# Patient Record
Sex: MALE | Race: ASIAN | NOT HISPANIC OR LATINO | URBAN - METROPOLITAN AREA
[De-identification: names, ages, dates, MRNs, and addresses within clinical notes are randomized per-mention and may not be internally consistent; named-entity substitution may affect disease eponyms.]

---

## 2021-12-08 ENCOUNTER — EMERGENCY (EMERGENCY)
Facility: HOSPITAL | Age: 26
LOS: 1 days | Discharge: HOME | End: 2021-12-08
Attending: STUDENT IN AN ORGANIZED HEALTH CARE EDUCATION/TRAINING PROGRAM
Payer: MEDICAID

## 2021-12-08 VITALS
HEIGHT: 63 IN | RESPIRATION RATE: 16 BRPM | SYSTOLIC BLOOD PRESSURE: 104 MMHG | TEMPERATURE: 99 F | HEART RATE: 90 BPM | WEIGHT: 130.07 LBS | DIASTOLIC BLOOD PRESSURE: 68 MMHG

## 2021-12-08 VITALS
SYSTOLIC BLOOD PRESSURE: 97 MMHG | RESPIRATION RATE: 17 BRPM | TEMPERATURE: 98 F | DIASTOLIC BLOOD PRESSURE: 62 MMHG | OXYGEN SATURATION: 98 % | HEART RATE: 88 BPM

## 2021-12-08 DIAGNOSIS — F29 UNSPECIFIED PSYCHOSIS NOT DUE TO A SUBSTANCE OR KNOWN PHYSIOLOGICAL CONDITION: ICD-10-CM

## 2021-12-08 LAB
ALBUMIN SERPL ELPH-MCNC: 4 G/DL — SIGNIFICANT CHANGE UP (ref 3.5–5)
ALP SERPL-CCNC: 53 U/L — SIGNIFICANT CHANGE UP (ref 40–120)
ALT FLD-CCNC: 33 U/L DA — SIGNIFICANT CHANGE UP (ref 10–60)
ANION GAP SERPL CALC-SCNC: 5 MMOL/L — SIGNIFICANT CHANGE UP (ref 5–17)
APAP SERPL-MCNC: <2 UG/ML — LOW (ref 10–30)
AST SERPL-CCNC: 25 U/L — SIGNIFICANT CHANGE UP (ref 10–40)
BASOPHILS # BLD AUTO: 0.02 K/UL — SIGNIFICANT CHANGE UP (ref 0–0.2)
BASOPHILS NFR BLD AUTO: 0.3 % — SIGNIFICANT CHANGE UP (ref 0–2)
BILIRUB SERPL-MCNC: 0.6 MG/DL — SIGNIFICANT CHANGE UP (ref 0.2–1.2)
BUN SERPL-MCNC: 15 MG/DL — SIGNIFICANT CHANGE UP (ref 7–18)
CALCIUM SERPL-MCNC: 9.3 MG/DL — SIGNIFICANT CHANGE UP (ref 8.4–10.5)
CHLORIDE SERPL-SCNC: 109 MMOL/L — HIGH (ref 96–108)
CO2 SERPL-SCNC: 26 MMOL/L — SIGNIFICANT CHANGE UP (ref 22–31)
CREAT SERPL-MCNC: 0.87 MG/DL — SIGNIFICANT CHANGE UP (ref 0.5–1.3)
EOSINOPHIL # BLD AUTO: 0.1 K/UL — SIGNIFICANT CHANGE UP (ref 0–0.5)
EOSINOPHIL NFR BLD AUTO: 1.3 % — SIGNIFICANT CHANGE UP (ref 0–6)
ETHANOL SERPL-MCNC: <3 MG/DL — SIGNIFICANT CHANGE UP (ref 0–10)
GLUCOSE SERPL-MCNC: 95 MG/DL — SIGNIFICANT CHANGE UP (ref 70–99)
HCT VFR BLD CALC: 47 % — SIGNIFICANT CHANGE UP (ref 39–50)
HGB BLD-MCNC: 14.9 G/DL — SIGNIFICANT CHANGE UP (ref 13–17)
IMM GRANULOCYTES NFR BLD AUTO: 0.5 % — SIGNIFICANT CHANGE UP (ref 0–1.5)
LYMPHOCYTES # BLD AUTO: 1.97 K/UL — SIGNIFICANT CHANGE UP (ref 1–3.3)
LYMPHOCYTES # BLD AUTO: 25.7 % — SIGNIFICANT CHANGE UP (ref 13–44)
MCHC RBC-ENTMCNC: 30 PG — SIGNIFICANT CHANGE UP (ref 27–34)
MCHC RBC-ENTMCNC: 31.7 GM/DL — LOW (ref 32–36)
MCV RBC AUTO: 94.8 FL — SIGNIFICANT CHANGE UP (ref 80–100)
MONOCYTES # BLD AUTO: 0.65 K/UL — SIGNIFICANT CHANGE UP (ref 0–0.9)
MONOCYTES NFR BLD AUTO: 8.5 % — SIGNIFICANT CHANGE UP (ref 2–14)
NEUTROPHILS # BLD AUTO: 4.88 K/UL — SIGNIFICANT CHANGE UP (ref 1.8–7.4)
NEUTROPHILS NFR BLD AUTO: 63.7 % — SIGNIFICANT CHANGE UP (ref 43–77)
NRBC # BLD: 0 /100 WBCS — SIGNIFICANT CHANGE UP (ref 0–0)
PLATELET # BLD AUTO: 366 K/UL — SIGNIFICANT CHANGE UP (ref 150–400)
POTASSIUM SERPL-MCNC: 4.1 MMOL/L — SIGNIFICANT CHANGE UP (ref 3.5–5.3)
POTASSIUM SERPL-SCNC: 4.1 MMOL/L — SIGNIFICANT CHANGE UP (ref 3.5–5.3)
PROT SERPL-MCNC: 7.7 G/DL — SIGNIFICANT CHANGE UP (ref 6–8.3)
RBC # BLD: 4.96 M/UL — SIGNIFICANT CHANGE UP (ref 4.2–5.8)
RBC # FLD: 13.5 % — SIGNIFICANT CHANGE UP (ref 10.3–14.5)
SALICYLATES SERPL-MCNC: <1.7 MG/DL — LOW (ref 2.8–20)
SARS-COV-2 RNA SPEC QL NAA+PROBE: SIGNIFICANT CHANGE UP
SODIUM SERPL-SCNC: 140 MMOL/L — SIGNIFICANT CHANGE UP (ref 135–145)
WBC # BLD: 7.66 K/UL — SIGNIFICANT CHANGE UP (ref 3.8–10.5)
WBC # FLD AUTO: 7.66 K/UL — SIGNIFICANT CHANGE UP (ref 3.8–10.5)

## 2021-12-08 PROCEDURE — 93005 ELECTROCARDIOGRAM TRACING: CPT

## 2021-12-08 PROCEDURE — 93010 ELECTROCARDIOGRAM REPORT: CPT

## 2021-12-08 PROCEDURE — 85025 COMPLETE CBC W/AUTO DIFF WBC: CPT

## 2021-12-08 PROCEDURE — 99285 EMERGENCY DEPT VISIT HI MDM: CPT

## 2021-12-08 PROCEDURE — 36415 COLL VENOUS BLD VENIPUNCTURE: CPT

## 2021-12-08 PROCEDURE — 87635 SARS-COV-2 COVID-19 AMP PRB: CPT

## 2021-12-08 PROCEDURE — 80307 DRUG TEST PRSMV CHEM ANLYZR: CPT

## 2021-12-08 PROCEDURE — 90792 PSYCH DIAG EVAL W/MED SRVCS: CPT | Mod: 95

## 2021-12-08 PROCEDURE — 80053 COMPREHEN METABOLIC PANEL: CPT

## 2021-12-08 RX ORDER — MIDAZOLAM HYDROCHLORIDE 1 MG/ML
2 INJECTION, SOLUTION INTRAMUSCULAR; INTRAVENOUS ONCE
Refills: 0 | Status: COMPLETED | OUTPATIENT
Start: 2021-12-08 | End: 2021-12-08

## 2021-12-08 NOTE — ED ADULT NURSE NOTE - SUICIDE SCREENING QUESTION 1
Acute osteomyelitis of the R first distal phalanx  R hallux wound since 10/2020 with infection, s/p augmentin, had worsening of infection with gangrene, purulence and malodor. Had been on doxycycline at home. Seen in wound care clinic and recommended for amputation.   MRI confirmed OM of the R first distal phalanx.  S/p pip-tazo once in the ER, held antibiotic for now   Afebrile, nontoxic, no leukocytosis    Follow R hallux wound culture  Podiatry following, planning for OR for amputation  Vascular surgery and wound care consulted   Hold off on further antibiotic dosing at this time, patient stable, try to increase OR culture yield.  Discussed plan as above with patient at length, all questions and concerns addressed, patient waiting for OR for amputation No

## 2021-12-08 NOTE — ED ADULT NURSE NOTE - OBJECTIVE STATEMENT
"My  wants me to see a psychiatrist as I am hearing voices, I feel people are talking about me". Roland suicidal ideation ,

## 2021-12-08 NOTE — ED BEHAVIORAL HEALTH ASSESSMENT NOTE - DESCRIPTION
as per HPI Per RN, pt was initially calm but after ~6 hours waiting in the ED, he became verbally aggressive, stating he wanted to leave rather than wait for psychiatric evaluation.  Pt declined medication and was able to calm down without need for PRN medication.  Pt denied SI but reports he wants to punch things when he gets angry.  Pt was BIBS and has not had any visitors. HIV+

## 2021-12-08 NOTE — ED PROVIDER NOTE - OBJECTIVE STATEMENT
25-year-old male hx of HIV reportedly well controlled, unclear psych hx presenting with hearing voices. Denies SI, HI, visual hallucinations. Had this problem in the past 5-6 years ago, does not know of any psych diagnoses and does not take any Psych meds. No other symptoms.

## 2021-12-08 NOTE — ED BEHAVIORAL HEALTH ASSESSMENT NOTE - DIFFERENTIAL
substance-induced mood/psychotic disorder, depression, schizophrenia, anxiety, insomnia, PTSD, adjustment disorder

## 2021-12-08 NOTE — ED PROVIDER NOTE - CLINICAL SUMMARY MEDICAL DECISION MAKING FREE TEXT BOX
25-year-old male hx of HIV reportedly well controlled, unclear psych hx presenting with hearing voices. Medically cleared. Pending Psych eval.

## 2021-12-08 NOTE — ED PROVIDER NOTE - PATIENT PORTAL LINK FT
You can access the FollowMyHealth Patient Portal offered by [facility] by registering at the following website: [email or web address]. By joining [employer] [facility]’s FollowMyHealth portal, you will also be able to view your health information using other applications (apps) compatible with our system.

## 2021-12-08 NOTE — ED BEHAVIORAL HEALTH ASSESSMENT NOTE - LEGAL HISTORY
on probation for drug-related charges, states he has to do community service and has not actually been convicted yet, denies hx of violent offenses

## 2021-12-08 NOTE — ED BEHAVIORAL HEALTH ASSESSMENT NOTE - RISK ASSESSMENT
Low Acute Suicide Risk risk factors: hx substance use, male, single, not engaged in outpatient tx, hx SA, hx trauma    protective factors: denies SI/HI, denies hx of violence, denies access to guns/weapons, future oriented, identifies reasons to live, able to participate in safety planning, motivated for outpatient tx, engaged in work
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
## 2021-12-08 NOTE — ED ADULT NURSE NOTE - NSIMPLEMENTINTERV_GEN_ALL_ED
Implemented All Universal Safety Interventions:  [place] to call system. Call bell, personal items and telephone within reach. Instruct patient to call for assistance. Room bathroom lighting operational. Non-slip footwear when patient is off stretcher. Physically safe environment: no spills, clutter or unnecessary equipment. Stretcher in lowest position, wheels locked, appropriate side rails in place.
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
## 2021-12-08 NOTE — ED ADULT TRIAGE NOTE - CHIEF COMPLAINT QUOTE
"My  wants me to see a psychiatrist as I am hearing voices, I feel people are talking about me". Roland suicidal ideation

## 2021-12-08 NOTE — ED BEHAVIORAL HEALTH ASSESSMENT NOTE - SAFETY PLAN ADDT'L DETAILS
Safety plan discussed with.../Education provided regarding environmental safety / lethal means restriction/Provision of National Suicide Prevention Lifeline 9-228-011-MBBY (7444)

## 2021-12-08 NOTE — ED PROVIDER NOTE - PROGRESS NOTE DETAILS
jeancarlos:  s/o received from dr. harris  pt w unk psych history p/w auditory hallucinations ongoing, no si/hi  endorsed pending psych eval  pt seen by telepsych and I spoke with attending who states that pt is cleared, mentating clearly, can f/up with outpt facility, resources faxed. pt w no si/hi, aaox3, is calm, did not receive the benzos, so I held it given that pt is doing well currently. pt wants to leave. stable for dc w psych f/up.
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
## 2023-01-03 NOTE — ED ADULT NURSE NOTE - GROOMING
**[employer] accepts [employer]. [facility]   [facility] [address] Renetta Robin, [address] [facility]  Phone: [phone] [facility] [address] [facility]  [address]. [place], [address]  Phone: [phone]       Please be informed that if you contact our office outside of normal business hours the physician on call cannot help with any scheduling or rescheduling issues, procedure instruction questions or any type of medication issue. We advise you for any urgent/emergency that you go to the nearest emergency room! PLEASE CALL OUR OFFICE DURING NORMAL BUSINESS HOURS    Monday - Friday   8 am to 5 pm    [place] [address] [address]: [phone]    [place]:  [phone]    Thank you for allowing us to care for you today! We want to ensure we can follow your treatment plan and we strive to give you the best outcomes and experience possible. If you ever have a life threatening emergency and call 911 - for an ambulance (EMS)   Our providers can only care for you at:   [facility] or [facility]. Even if you have someone take you or you drive yourself we can only care for you in a [facility] facility. Our providers are not setup at the other healthcare locations!
[facility]

## 2023-02-11 ENCOUNTER — EMERGENCY (EMERGENCY)
Facility: HOSPITAL | Age: 28
LOS: 1 days | Discharge: HOME | End: 2023-02-11
Attending: EMERGENCY MEDICINE
Payer: MEDICAID

## 2023-02-11 VITALS
RESPIRATION RATE: 17 BRPM | TEMPERATURE: 98 F | SYSTOLIC BLOOD PRESSURE: 114 MMHG | DIASTOLIC BLOOD PRESSURE: 82 MMHG | HEART RATE: 95 BPM | OXYGEN SATURATION: 98 %

## 2023-02-11 VITALS
SYSTOLIC BLOOD PRESSURE: 126 MMHG | OXYGEN SATURATION: 99 % | HEART RATE: 95 BPM | DIASTOLIC BLOOD PRESSURE: 74 MMHG | RESPIRATION RATE: 16 BRPM | WEIGHT: 128.09 LBS | HEIGHT: 63 IN | TEMPERATURE: 98 F

## 2023-02-11 LAB
ALBUMIN SERPL ELPH-MCNC: 4.4 G/DL — SIGNIFICANT CHANGE UP (ref 3.5–5)
ALP SERPL-CCNC: 62 U/L — SIGNIFICANT CHANGE UP (ref 40–120)
ALT FLD-CCNC: 41 U/L DA — SIGNIFICANT CHANGE UP (ref 10–60)
ANION GAP SERPL CALC-SCNC: 8 MMOL/L — SIGNIFICANT CHANGE UP (ref 5–17)
AST SERPL-CCNC: 44 U/L — HIGH (ref 10–40)
BASOPHILS # BLD AUTO: 0.02 K/UL — SIGNIFICANT CHANGE UP (ref 0–0.2)
BASOPHILS NFR BLD AUTO: 0.2 % — SIGNIFICANT CHANGE UP (ref 0–2)
BILIRUB SERPL-MCNC: 0.9 MG/DL — SIGNIFICANT CHANGE UP (ref 0.2–1.2)
BUN SERPL-MCNC: 18 MG/DL — SIGNIFICANT CHANGE UP (ref 7–18)
CALCIUM SERPL-MCNC: 9.6 MG/DL — SIGNIFICANT CHANGE UP (ref 8.4–10.5)
CHLORIDE SERPL-SCNC: 104 MMOL/L — SIGNIFICANT CHANGE UP (ref 96–108)
CO2 SERPL-SCNC: 28 MMOL/L — SIGNIFICANT CHANGE UP (ref 22–31)
CREAT SERPL-MCNC: 0.96 MG/DL — SIGNIFICANT CHANGE UP (ref 0.5–1.3)
EGFR: 110 ML/MIN/1.73M2 — SIGNIFICANT CHANGE UP
EOSINOPHIL # BLD AUTO: 0 K/UL — SIGNIFICANT CHANGE UP (ref 0–0.5)
EOSINOPHIL NFR BLD AUTO: 0 % — SIGNIFICANT CHANGE UP (ref 0–6)
ETHANOL SERPL-MCNC: <3 MG/DL — SIGNIFICANT CHANGE UP (ref 0–10)
GLUCOSE SERPL-MCNC: 112 MG/DL — HIGH (ref 70–99)
HCT VFR BLD CALC: 47.7 % — SIGNIFICANT CHANGE UP (ref 39–50)
HGB BLD-MCNC: 15.9 G/DL — SIGNIFICANT CHANGE UP (ref 13–17)
IMM GRANULOCYTES NFR BLD AUTO: 0.3 % — SIGNIFICANT CHANGE UP (ref 0–0.9)
LYMPHOCYTES # BLD AUTO: 1.05 K/UL — SIGNIFICANT CHANGE UP (ref 1–3.3)
LYMPHOCYTES # BLD AUTO: 12.1 % — LOW (ref 13–44)
MAGNESIUM SERPL-MCNC: 2.4 MG/DL — SIGNIFICANT CHANGE UP (ref 1.6–2.6)
MCHC RBC-ENTMCNC: 30.8 PG — SIGNIFICANT CHANGE UP (ref 27–34)
MCHC RBC-ENTMCNC: 33.3 GM/DL — SIGNIFICANT CHANGE UP (ref 32–36)
MCV RBC AUTO: 92.4 FL — SIGNIFICANT CHANGE UP (ref 80–100)
MONOCYTES # BLD AUTO: 0.41 K/UL — SIGNIFICANT CHANGE UP (ref 0–0.9)
MONOCYTES NFR BLD AUTO: 4.7 % — SIGNIFICANT CHANGE UP (ref 2–14)
NEUTROPHILS # BLD AUTO: 7.15 K/UL — SIGNIFICANT CHANGE UP (ref 1.8–7.4)
NEUTROPHILS NFR BLD AUTO: 82.7 % — HIGH (ref 43–77)
NRBC # BLD: 0 /100 WBCS — SIGNIFICANT CHANGE UP (ref 0–0)
PCP SPEC-MCNC: SIGNIFICANT CHANGE UP
PLATELET # BLD AUTO: 416 K/UL — HIGH (ref 150–400)
POTASSIUM SERPL-MCNC: 4.1 MMOL/L — SIGNIFICANT CHANGE UP (ref 3.5–5.3)
POTASSIUM SERPL-SCNC: 4.1 MMOL/L — SIGNIFICANT CHANGE UP (ref 3.5–5.3)
PROT SERPL-MCNC: 8.6 G/DL — HIGH (ref 6–8.3)
RBC # BLD: 5.16 M/UL — SIGNIFICANT CHANGE UP (ref 4.2–5.8)
RBC # FLD: 12.9 % — SIGNIFICANT CHANGE UP (ref 10.3–14.5)
SODIUM SERPL-SCNC: 140 MMOL/L — SIGNIFICANT CHANGE UP (ref 135–145)
WBC # BLD: 8.66 K/UL — SIGNIFICANT CHANGE UP (ref 3.8–10.5)
WBC # FLD AUTO: 8.66 K/UL — SIGNIFICANT CHANGE UP (ref 3.8–10.5)

## 2023-02-11 PROCEDURE — 83735 ASSAY OF MAGNESIUM: CPT

## 2023-02-11 PROCEDURE — 85025 COMPLETE CBC W/AUTO DIFF WBC: CPT

## 2023-02-11 PROCEDURE — 80053 COMPREHEN METABOLIC PANEL: CPT

## 2023-02-11 PROCEDURE — 99283 EMERGENCY DEPT VISIT LOW MDM: CPT

## 2023-02-11 PROCEDURE — 36415 COLL VENOUS BLD VENIPUNCTURE: CPT

## 2023-02-11 PROCEDURE — 80307 DRUG TEST PRSMV CHEM ANLYZR: CPT

## 2023-02-11 PROCEDURE — 99284 EMERGENCY DEPT VISIT MOD MDM: CPT

## 2023-02-11 PROCEDURE — 99053 MED SERV 10PM-8AM 24 HR FAC: CPT

## 2023-02-11 RX ORDER — SODIUM CHLORIDE 9 MG/ML
1000 INJECTION INTRAMUSCULAR; INTRAVENOUS; SUBCUTANEOUS ONCE
Refills: 0 | Status: DISCONTINUED | OUTPATIENT
Start: 2023-02-11 | End: 2023-02-14

## 2023-02-11 NOTE — ED PROVIDER NOTE - PATIENT PORTAL LINK FT
You can access the FollowMyHealth Patient Portal offered by [facility] by registering at the following website: [email or web address]. By joining [employer]’s FollowMyHealth portal, you will also be able to view your health information using other applications (apps) compatible with our system.

## 2023-02-11 NOTE — ED ADULT NURSE NOTE - OBJECTIVE STATEMENT
pt stated they gave me a drink c/o feeling dizzy and hard to breathe. pt talking in complete sentences.

## 2023-02-11 NOTE — ED ADULT NURSE NOTE - NSIMPLEMENTINTERV_GEN_ALL_ED
Implemented All Fall Risk Interventions:  [place] to call system. Call bell, personal items and telephone within reach. Instruct patient to call for assistance. Room bathroom lighting operational. Non-slip footwear when patient is off stretcher. Physically safe environment: no spills, clutter or unnecessary equipment. Stretcher in lowest position, wheels locked, appropriate side rails in place. Provide visual cue, wrist band, yellow gown, etc. Monitor gait and stability. Monitor for mental status changes and reorient to person, place, and time. Review medications for side effects contributing to fall risk. Reinforce activity limits and safety measures with patient and family.

## 2023-02-11 NOTE — ED PROVIDER NOTE - PROGRESS NOTE DETAILS
pt feels improved. ambulatory with steady gait. labs unremarkable. pt refusing ct head- states feels well and is AAOx3 at this time so will cancel ct head. ?etoh vs ?drugs. will dc. f/u with PMD. return precautions discussed.
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
## 2023-02-11 NOTE — ED PROVIDER NOTE - CLINICAL SUMMARY MEDICAL DECISION MAKING FREE TEXT BOX
27 year old male with ha, dizziness, nausea. PE as above.  labs, ct head, utox, fluid bolus, reassess

## 2023-02-11 NOTE — ED PROVIDER NOTE - NSFOLLOWUPINSTRUCTIONS_ED_ALL_ED_FT
[facility]                                                                                                             Dizziness      Dizziness is a common problem. It is a feeling of unsteadiness or light-headedness. You may feel like you are about to faint. Dizziness can lead to injury if you stumble or fall. Anyone can become dizzy, but dizziness is more common in older adults. This condition can be caused by a number of things, including medicines, dehydration, or illness.      Follow these instructions at home:      Eating and drinking   A comparison of three sample cups showing dark yellow, yellow, and pale yellow urine.   •Drink enough fluid to keep your urine pale yellow. This helps to keep you from becoming dehydrated. Try to drink more clear fluids, such as water.      • Do not drink alcohol.      •Limit your caffeine intake if told to do so by your health care provider. Check ingredients and nutrition facts to see if a food or beverage contains caffeine.      •Limit your salt (sodium) intake if told to do so by your health care provider. Check ingredients and nutrition facts to see if a food or beverage contains sodium.        Activity   A sign showing that a person should not drive. •Avoid making quick movements.  •Rise slowly from chairs and steady yourself until you feel okay.      •In the morning, first sit up on the side of the bed. When you feel okay, stand slowly while you hold onto something until you know that your balance is good.        •If you need to  one place for a long time, move your legs often. Tighten and relax the muscles in your legs while you are standing.      • Do not drive or use machinery if you feel dizzy.      •Avoid bending down if you feel dizzy. Place items in your home so that they are easy for you to reach without leaning over.      Lifestyle     • Do not use any products that contain nicotine or tobacco. These products include cigarettes, chewing tobacco, and vaping devices, such as e-cigarettes. If you need help quitting, ask your health care provider.      •Try to reduce your stress level by using methods such as yoga or meditation. Talk with your health care provider if you need help to manage your stress.      General instructions     •Watch your dizziness for any changes.      •Take over-the-counter and prescription medicines only as told by your health care provider. Talk with your health care provider if you think that your dizziness is caused by a medicine that you are taking.      •Tell a friend or a family member that you are feeling dizzy. If he or she notices any changes in your behavior, have this person call your health care provider.      •Keep all follow-up visits. This is important.        Contact a health care provider if:    •Your dizziness does not go away or you have new symptoms.      •Your dizziness or light-headedness gets worse.      •You feel nauseous.      •You have reduced hearing.      •You have a fever.      •You have neck pain or a stiff neck.      •Your dizziness leads to an injury or a fall.        Get help right away if:    •You vomit or have diarrhea and are unable to eat or drink anything.      •You have problems talking, walking, swallowing, or using your arms, hands, or legs.      •You feel generally weak.      •You have any bleeding.      •You are not thinking clearly or you have trouble forming sentences. It may take a friend or family member to notice this.      •You have chest pain, abdominal pain, shortness of breath, or sweating.      •Your vision changes or you develop a severe headache.      These symptoms may represent a serious problem that is an emergency. Do not wait to see if the symptoms will go away. Get medical help right away. Call your local emergency services (911 in the [place] Do not drive yourself to the hospital.       Summary    •Dizziness is a feeling of unsteadiness or light-headedness. This condition can be caused by a number of things, including medicines, dehydration, or illness.      •Anyone can become dizzy, but dizziness is more common in older adults.      •Drink enough fluid to keep your urine pale yellow. Do not drink alcohol.      •Avoid making quick movements if you feel dizzy. Monitor your dizziness for any changes.      This information is not intended to replace advice given to you by your health care provider. Make sure you discuss any questions you have with your health care provider.      Document Revised: 11/22/2021 Document Reviewed: 11/22/2021    Elsevier Patient Education © 2022 Elsevier Inc.

## 2023-06-26 NOTE — ED BEHAVIORAL HEALTH ASSESSMENT NOTE - VIOLENCE RISK FACTORS:
Render Risk Assessment In Note?: no Additional Notes: Pt has hx of depression and wants to avoid Accutane at this time Detail Level: Simple Noncompliance with treatment

## 2023-07-28 NOTE — ED ADULT TRIAGE NOTE - HAVE YOU HAD A FIRST COVID-19 BOOSTER?
Tylenol  Plain Mucinex twice daily   Continue saline flushes  Fluids/steam   Follow up with your PCP within a week for your elevated blood pressure reading.   Follow up with ENT on Thursday, as scheduled.    Get your COVID booster as soon as possible.    Thank you for choosing [facility] for your healthcare needs.    We strive to provide you with excellent service and hope that we have exceeded your expectations.     If you receive a survey in the mail, we hope that you will complete it and agree that we have provided \"Very Good\" care today.  If you would like to speak to us about your visit, please contact our center at:    [facility]  [phone]    [facility]  [phone]    [facility]  [phone]    
Yes
24-Nov-2021 00:44

## 2023-08-27 ENCOUNTER — INPATIENT (INPATIENT)
Facility: HOSPITAL | Age: 28
LOS: 1 days | Discharge: HOME | DRG: 602 | End: 2023-08-29
Attending: STUDENT IN AN ORGANIZED HEALTH CARE EDUCATION/TRAINING PROGRAM | Admitting: STUDENT IN AN ORGANIZED HEALTH CARE EDUCATION/TRAINING PROGRAM
Payer: MEDICAID

## 2023-08-27 VITALS
WEIGHT: 128.09 LBS | OXYGEN SATURATION: 96 % | SYSTOLIC BLOOD PRESSURE: 113 MMHG | RESPIRATION RATE: 18 BRPM | DIASTOLIC BLOOD PRESSURE: 86 MMHG | TEMPERATURE: 98 F | HEART RATE: 96 BPM

## 2023-08-27 LAB
ALBUMIN SERPL ELPH-MCNC: 3.1 G/DL — LOW (ref 3.5–5)
ALP SERPL-CCNC: 67 U/L — SIGNIFICANT CHANGE UP (ref 40–120)
ALT FLD-CCNC: 30 U/L DA — SIGNIFICANT CHANGE UP (ref 10–60)
ANION GAP SERPL CALC-SCNC: 7 MMOL/L — SIGNIFICANT CHANGE UP (ref 5–17)
AST SERPL-CCNC: 35 U/L — SIGNIFICANT CHANGE UP (ref 10–40)
BASE EXCESS BLDV CALC-SCNC: 0.5 MMOL/L — SIGNIFICANT CHANGE UP
BASOPHILS # BLD AUTO: 0.04 K/UL — SIGNIFICANT CHANGE UP (ref 0–0.2)
BASOPHILS NFR BLD AUTO: 0.3 % — SIGNIFICANT CHANGE UP (ref 0–2)
BILIRUB SERPL-MCNC: 0.4 MG/DL — SIGNIFICANT CHANGE UP (ref 0.2–1.2)
BLOOD GAS COMMENTS, VENOUS: SIGNIFICANT CHANGE UP
BUN SERPL-MCNC: 16 MG/DL — SIGNIFICANT CHANGE UP (ref 7–18)
CA-I SERPL-SCNC: SIGNIFICANT CHANGE UP MMOL/L (ref 1.15–1.33)
CALCIUM SERPL-MCNC: 7.8 MG/DL — LOW (ref 8.4–10.5)
CHLORIDE SERPL-SCNC: 103 MMOL/L — SIGNIFICANT CHANGE UP (ref 96–108)
CO2 SERPL-SCNC: 25 MMOL/L — SIGNIFICANT CHANGE UP (ref 22–31)
CREAT SERPL-MCNC: 0.76 MG/DL — SIGNIFICANT CHANGE UP (ref 0.5–1.3)
EGFR: 125 ML/MIN/1.73M2 — SIGNIFICANT CHANGE UP
EOSINOPHIL # BLD AUTO: 0.02 K/UL — SIGNIFICANT CHANGE UP (ref 0–0.5)
EOSINOPHIL NFR BLD AUTO: 0.1 % — SIGNIFICANT CHANGE UP (ref 0–6)
GAS PNL BLDV: 131 MMOL/L — LOW (ref 136–145)
GAS PNL BLDV: SIGNIFICANT CHANGE UP
GLUCOSE SERPL-MCNC: 104 MG/DL — HIGH (ref 70–99)
HCO3 BLDV-SCNC: 25 MMOL/L — SIGNIFICANT CHANGE UP (ref 22–29)
HCT VFR BLD CALC: 40 % — SIGNIFICANT CHANGE UP (ref 39–50)
HGB BLD-MCNC: 13.6 G/DL — SIGNIFICANT CHANGE UP (ref 13–17)
IMM GRANULOCYTES NFR BLD AUTO: 0.4 % — SIGNIFICANT CHANGE UP (ref 0–0.9)
LACTATE BLDV-MCNC: 1 MMOL/L — SIGNIFICANT CHANGE UP (ref 0.5–2)
LYMPHOCYTES # BLD AUTO: 0.9 K/UL — LOW (ref 1–3.3)
LYMPHOCYTES # BLD AUTO: 5.8 % — LOW (ref 13–44)
MAGNESIUM SERPL-MCNC: 1.9 MG/DL — SIGNIFICANT CHANGE UP (ref 1.6–2.6)
MCHC RBC-ENTMCNC: 31.3 PG — SIGNIFICANT CHANGE UP (ref 27–34)
MCHC RBC-ENTMCNC: 34 GM/DL — SIGNIFICANT CHANGE UP (ref 32–36)
MCV RBC AUTO: 92.2 FL — SIGNIFICANT CHANGE UP (ref 80–100)
MONOCYTES # BLD AUTO: 1 K/UL — HIGH (ref 0–0.9)
MONOCYTES NFR BLD AUTO: 6.4 % — SIGNIFICANT CHANGE UP (ref 2–14)
NEUTROPHILS # BLD AUTO: 13.51 K/UL — HIGH (ref 1.8–7.4)
NEUTROPHILS NFR BLD AUTO: 87 % — HIGH (ref 43–77)
NRBC # BLD: 0 /100 WBCS — SIGNIFICANT CHANGE UP (ref 0–0)
PCO2 BLDV: 41 MMHG — LOW (ref 42–55)
PH BLDV: 7.4 — SIGNIFICANT CHANGE UP (ref 7.32–7.43)
PLATELET # BLD AUTO: 329 K/UL — SIGNIFICANT CHANGE UP (ref 150–400)
PO2 BLDV: 64 MMHG — SIGNIFICANT CHANGE UP
POTASSIUM BLDV-SCNC: 3.5 MMOL/L — SIGNIFICANT CHANGE UP (ref 3.5–5.1)
POTASSIUM SERPL-MCNC: 3.6 MMOL/L — SIGNIFICANT CHANGE UP (ref 3.5–5.3)
POTASSIUM SERPL-SCNC: 3.6 MMOL/L — SIGNIFICANT CHANGE UP (ref 3.5–5.3)
PROT SERPL-MCNC: 7.1 G/DL — SIGNIFICANT CHANGE UP (ref 6–8.3)
RBC # BLD: 4.34 M/UL — SIGNIFICANT CHANGE UP (ref 4.2–5.8)
RBC # FLD: 12.7 % — SIGNIFICANT CHANGE UP (ref 10.3–14.5)
SAO2 % BLDV: 91.1 % — SIGNIFICANT CHANGE UP
SODIUM SERPL-SCNC: 135 MMOL/L — SIGNIFICANT CHANGE UP (ref 135–145)
WBC # BLD: 15.53 K/UL — HIGH (ref 3.8–10.5)
WBC # FLD AUTO: 15.53 K/UL — HIGH (ref 3.8–10.5)

## 2023-08-27 PROCEDURE — 99285 EMERGENCY DEPT VISIT HI MDM: CPT

## 2023-08-27 PROCEDURE — 73130 X-RAY EXAM OF HAND: CPT | Mod: 26,LT

## 2023-08-27 PROCEDURE — 93010 ELECTROCARDIOGRAM REPORT: CPT

## 2023-08-27 RX ORDER — MORPHINE SULFATE 50 MG/1
2 CAPSULE, EXTENDED RELEASE ORAL ONCE
Refills: 0 | Status: DISCONTINUED | OUTPATIENT
Start: 2023-08-27 | End: 2023-08-27

## 2023-08-27 RX ORDER — HALOPERIDOL DECANOATE 100 MG/ML
5 INJECTION INTRAMUSCULAR ONCE
Refills: 0 | Status: COMPLETED | OUTPATIENT
Start: 2023-08-27 | End: 2023-08-27

## 2023-08-27 RX ORDER — KETAMINE HYDROCHLORIDE 100 MG/ML
30 INJECTION INTRAMUSCULAR; INTRAVENOUS ONCE
Refills: 0 | Status: DISCONTINUED | OUTPATIENT
Start: 2023-08-27 | End: 2023-08-27

## 2023-08-27 RX ORDER — PIPERACILLIN AND TAZOBACTAM 4; .5 G/20ML; G/20ML
3.38 INJECTION, POWDER, LYOPHILIZED, FOR SOLUTION INTRAVENOUS ONCE
Refills: 0 | Status: COMPLETED | OUTPATIENT
Start: 2023-08-27 | End: 2023-08-27

## 2023-08-27 RX ORDER — ONDANSETRON 8 MG/1
4 TABLET, FILM COATED ORAL ONCE
Refills: 0 | Status: COMPLETED | OUTPATIENT
Start: 2023-08-27 | End: 2023-08-27

## 2023-08-27 RX ORDER — TETANUS TOXOID, REDUCED DIPHTHERIA TOXOID AND ACELLULAR PERTUSSIS VACCINE, ADSORBED 5; 2.5; 8; 8; 2.5 [IU]/.5ML; [IU]/.5ML; UG/.5ML; UG/.5ML; UG/.5ML
0.5 SUSPENSION INTRAMUSCULAR ONCE
Refills: 0 | Status: COMPLETED | OUTPATIENT
Start: 2023-08-27 | End: 2023-08-27

## 2023-08-27 RX ADMIN — MORPHINE SULFATE 2 MILLIGRAM(S): 50 CAPSULE, EXTENDED RELEASE ORAL at 22:30

## 2023-08-27 RX ADMIN — KETAMINE HYDROCHLORIDE 30 MILLIGRAM(S): 100 INJECTION INTRAMUSCULAR; INTRAVENOUS at 20:55

## 2023-08-27 RX ADMIN — HALOPERIDOL DECANOATE 5 MILLIGRAM(S): 100 INJECTION INTRAMUSCULAR at 22:04

## 2023-08-27 RX ADMIN — MORPHINE SULFATE 2 MILLIGRAM(S): 50 CAPSULE, EXTENDED RELEASE ORAL at 20:11

## 2023-08-27 RX ADMIN — TETANUS TOXOID, REDUCED DIPHTHERIA TOXOID AND ACELLULAR PERTUSSIS VACCINE, ADSORBED 0.5 MILLILITER(S): 5; 2.5; 8; 8; 2.5 SUSPENSION INTRAMUSCULAR at 23:21

## 2023-08-27 RX ADMIN — Medication 2 MILLIGRAM(S): at 22:04

## 2023-08-27 RX ADMIN — KETAMINE HYDROCHLORIDE 30 MILLIGRAM(S): 100 INJECTION INTRAMUSCULAR; INTRAVENOUS at 20:28

## 2023-08-27 RX ADMIN — PIPERACILLIN AND TAZOBACTAM 200 GRAM(S): 4; .5 INJECTION, POWDER, LYOPHILIZED, FOR SOLUTION INTRAVENOUS at 22:55

## 2023-08-27 RX ADMIN — ONDANSETRON 4 MILLIGRAM(S): 8 TABLET, FILM COATED ORAL at 19:27

## 2023-08-27 NOTE — ED PROVIDER NOTE - PHYSICAL EXAMINATION
GENERAL: no acute distress, non-toxic appearing  HEAD: normocephalic, atraumatic  HEENT: normal conjunctiva, oral mucosa moist, neck supple  CARDIAC: regular rate and rhythm, normal S1 and S2,  no appreciable murmurs  PULM: clear to ascultation bilaterally, no crackles, rales, rhonchi, or wheezing  GI: abdomen nondistended, soft, nontender, no guarding or rebound tenderness  : no CVA tenderness, no suprapubic tenderness  NEURO: alert and oriented x 3, normal speech, PERRLA, EOMI, no focal motor or sensory deficits, nonantalgic gait    MSK:  There is a ringlike object over his left and middle finger at the base of the MTP.  The object is ulcerating into the dorsum of the left middle finger with associated swelling.    SKIN: no visible rashes, dry, well-perfused  PSYCH: appropriate mood and affect

## 2023-08-27 NOTE — ED ADULT NURSE NOTE - OBJECTIVE STATEMENT
pt is here stating " I have ' key chain stuck on my hand " for the last 2 days, hand appears swollen and hot to touch
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
## 2023-08-27 NOTE — ED ADULT NURSE REASSESSMENT NOTE - NS ED NURSE REASSESS COMMENT FT1
Left 3rd finger and 4th finger swollen. Wound to left 3rd finger.  Pt refusing all medications in antibiotics. Pt verbalizes that he wants to be discharged, refusing further evaluation. Pt noted to agitated, paranoid yelling in the ED. MD notified and at bedside to discuss plan of care with patient. Pt noted ot rambling w/o capacity to reason with. Code [employer] called. Nursing supervisor, security team and writer at bedside. Pt Continues to shout in the ED and unable to acknowledge disease process. [facility]  services used, family called. Plan to possible medicated patient .

## 2023-08-27 NOTE — ED ADULT NURSE NOTE - BREATH SOUNDS, MLM
Blood Patch  Performed by: SUYAPA PIZARRO  Authorized by: SUYAPA PIZARRO     Patient Location:  ED  Start Time:  4/26/2019 10:48 AM  End Time:  4/26/2019 11:02 AM  Reason for Blood Patch: spinal headache    2 patient identifiers, IV checked, site marked, risks and benefits discussed, surgical consent, monitors and equipment checked, pre-op evaluation, timeout performed and anesthesia consent    Volume of Blood Injected:  10  Patient Position:  L lateral decubitus  Prep: povidone-iodine, Chloraprep, patient draped, Betadine and sterile technique    Monitoring:  EKG, continuous pulse oximetry and blood pressure monitoring  Approach:  Midline  Location:  L2-3  Injection Technique:  GAEL air  Injection Method:  Touhy needle  Needle Gauge:  18  Needle Length (in):  3.5  Loss of Resistance:  4  Evidence of CSF/Blood?:  No  Venous Blood Drawn By::  Nurse  Sterile Technique on Blood Draw?:  Yes  Volume:  10  Events: well tolerated                       Clear

## 2023-08-27 NOTE — ED ADULT TRIAGE NOTE - CHIEF COMPLAINT QUOTE
left hand swelling and redness , as per pt " I got a key chain stuck o my hand and I cannot get it out "

## 2023-08-27 NOTE — ED PROVIDER NOTE - CLINICAL SUMMARY MEDICAL DECISION MAKING FREE TEXT BOX
27-year-old male with no reported past medical history presents to the ED with a metal ring device encased around his left hand. Initial vitals reviewed and otherwise nonactionable.  Physical exam as noted above.  There is a ringlike object over his left and middle finger at the base of the MTP.  The object is ulcerating into the dorsum of the left middle finger with associated swelling.  Concerns for possible infectious etiology as well.  Will order labs, EKG, meds, imaging, and reassess.

## 2023-08-27 NOTE — ED ADULT NURSE NOTE - NSFALLUNIVINTERV_ED_ALL_ED
Bed/Stretcher in lowest position, wheels locked, appropriate side rails in place/Call bell, personal items and telephone in reach/Instruct patient to call for assistance before getting out of bed/chair/stretcher/Non-slip footwear applied when patient is off stretcher/[place] to call system/Physically safe environment - no spills, clutter or unnecessary equipment/Purposeful proactive rounding/Room/bathroom lighting operational, light cord in reach

## 2023-08-27 NOTE — ED ADULT NURSE REASSESSMENT NOTE - NS ED NURSE REASSESS COMMENT FT1
Pt medicated as per order. Security  team writer and MD at bedside. Pt placed. on constant observation for safety

## 2023-08-27 NOTE — ED PROVIDER NOTE - OBJECTIVE STATEMENT
27-year-old male with no reported past medical history presents to the ED with a metal ring device encased around his left hand.  The object has been encased around his right ring and right finger at the base TP for the last 2 days.  Patient is unclear why he placed the ringlike object over his hand.  He endorses associated pain.  He states that he was "high "before placing the object over his fingers. He denies any chest pain, shortness of breath, fevers, chills, nausea, vomiting, diarrhea.  He denies any other symptoms at bedside.

## 2023-08-28 DIAGNOSIS — Z21 ASYMPTOMATIC HUMAN IMMUNODEFICIENCY VIRUS [HIV] INFECTION STATUS: ICD-10-CM

## 2023-08-28 DIAGNOSIS — L03.90 CELLULITIS, UNSPECIFIED: ICD-10-CM

## 2023-08-28 DIAGNOSIS — Z29.9 ENCOUNTER FOR PROPHYLACTIC MEASURES, UNSPECIFIED: ICD-10-CM

## 2023-08-28 DIAGNOSIS — F19.90 OTHER PSYCHOACTIVE SUBSTANCE USE, UNSPECIFIED, UNCOMPLICATED: ICD-10-CM

## 2023-08-28 DIAGNOSIS — G92.8 OTHER TOXIC ENCEPHALOPATHY: ICD-10-CM

## 2023-08-28 DIAGNOSIS — G93.41 METABOLIC ENCEPHALOPATHY: ICD-10-CM

## 2023-08-28 DIAGNOSIS — S69.90XA UNSPECIFIED INJURY OF UNSPECIFIED WRIST, HAND AND FINGER(S), INITIAL ENCOUNTER: ICD-10-CM

## 2023-08-28 LAB
AMPHET UR-MCNC: POSITIVE
BARBITURATES UR SCN-MCNC: NEGATIVE — SIGNIFICANT CHANGE UP
BENZODIAZ UR-MCNC: NEGATIVE — SIGNIFICANT CHANGE UP
COCAINE METAB.OTHER UR-MCNC: NEGATIVE — SIGNIFICANT CHANGE UP
METHADONE UR-MCNC: NEGATIVE — SIGNIFICANT CHANGE UP
OPIATES UR-MCNC: POSITIVE
PCP SPEC-MCNC: SIGNIFICANT CHANGE UP
PCP UR-MCNC: NEGATIVE — SIGNIFICANT CHANGE UP
PROCALCITONIN SERPL-MCNC: 0.1 NG/ML — SIGNIFICANT CHANGE UP (ref 0.02–0.1)
THC UR QL: NEGATIVE — SIGNIFICANT CHANGE UP

## 2023-08-28 PROCEDURE — 99222 1ST HOSP IP/OBS MODERATE 55: CPT | Mod: GC

## 2023-08-28 PROCEDURE — 99222 1ST HOSP IP/OBS MODERATE 55: CPT

## 2023-08-28 RX ORDER — HALOPERIDOL DECANOATE 100 MG/ML
5 INJECTION INTRAMUSCULAR EVERY 6 HOURS
Refills: 0 | Status: DISCONTINUED | OUTPATIENT
Start: 2023-08-28 | End: 2023-08-29

## 2023-08-28 RX ORDER — ONDANSETRON 8 MG/1
4 TABLET, FILM COATED ORAL EVERY 8 HOURS
Refills: 0 | Status: DISCONTINUED | OUTPATIENT
Start: 2023-08-28 | End: 2023-08-29

## 2023-08-28 RX ORDER — SODIUM CHLORIDE 9 MG/ML
1000 INJECTION, SOLUTION INTRAVENOUS
Refills: 0 | Status: DISCONTINUED | OUTPATIENT
Start: 2023-08-28 | End: 2023-08-29

## 2023-08-28 RX ORDER — SODIUM CHLORIDE 9 MG/ML
1000 INJECTION, SOLUTION INTRAVENOUS ONCE
Refills: 0 | Status: COMPLETED | OUTPATIENT
Start: 2023-08-28 | End: 2023-08-28

## 2023-08-28 RX ORDER — ACETAMINOPHEN 500 MG
650 TABLET ORAL EVERY 6 HOURS
Refills: 0 | Status: DISCONTINUED | OUTPATIENT
Start: 2023-08-28 | End: 2023-08-29

## 2023-08-28 RX ORDER — VANCOMYCIN HCL 1 G
750 VIAL (EA) INTRAVENOUS EVERY 12 HOURS
Refills: 0 | Status: DISCONTINUED | OUTPATIENT
Start: 2023-08-28 | End: 2023-08-29

## 2023-08-28 RX ORDER — PIPERACILLIN AND TAZOBACTAM 4; .5 G/20ML; G/20ML
3.38 INJECTION, POWDER, LYOPHILIZED, FOR SOLUTION INTRAVENOUS ONCE
Refills: 0 | Status: COMPLETED | OUTPATIENT
Start: 2023-08-28 | End: 2023-08-28

## 2023-08-28 RX ADMIN — Medication 250 MILLIGRAM(S): at 22:34

## 2023-08-28 RX ADMIN — HALOPERIDOL DECANOATE 5 MILLIGRAM(S): 100 INJECTION INTRAMUSCULAR at 19:41

## 2023-08-28 RX ADMIN — PIPERACILLIN AND TAZOBACTAM 25 GRAM(S): 4; .5 INJECTION, POWDER, LYOPHILIZED, FOR SOLUTION INTRAVENOUS at 10:46

## 2023-08-28 RX ADMIN — SODIUM CHLORIDE 100 MILLILITER(S): 9 INJECTION, SOLUTION INTRAVENOUS at 22:35

## 2023-08-28 NOTE — CONSULT NOTE ADULT - SUBJECTIVE AND OBJECTIVE BOX
HPI:  28 yo M w polysubstance use disorder (including crystal meth w hx of rhabdo in 2016 from meth use), HIV+, unspecified past psych hx w documented hallucinations/psychosis, presented with injury to his Left hand. Pt was reportedly intoxicated on unspecified substance, and placed a metal ring? or device on his L middle and L ring fingers 2 days prior to ED presentation. As per chart review, pt does not know why he placed the object on his hand, and was endorsing pain. Pt required procedural sedation w ketamine, ativan, and pain relief w morphine to facilitate removal of the device w lilli by ERENDIRA. Initial vitals: T 98, HR 96, /86, RR 18, 96%RA, s/p 30 mg IV ketaminex2, 2 mg IV morphine, 2 mg IV ativan,s/p TDAP, s/p IV zosyn x2, then started on vancomycin.    Patient was still sedated at time of interview and unable to provide hx and ROS. Pt remained sedated at the time of the ID consult and cd not obtain further Hx.       PAST MEDICAL & SURGICAL HISTORY:  Polysubstance use disorder    HIV infection      MEDS:  vancomycin  IVPB 750 milliGRAM(s) IV Intermittent every 12 hours      ALLERGIES  No Known Allergies      SOCIAL HISTORY: cd not obtain from pt    FAMILY HISTORY: cd not obtain from pt      ROS: not obtained--pt sedated and not verbally responsive      PHYSICAL EXAM: limited due to pt's clinical situation    Vital Signs Last 24 Hrs  T(C): 37 (28 Aug 2023 11:30), Max: 37.8 (27 Aug 2023 22:00)  T(F): 98.6 (28 Aug 2023 11:30), Max: 100.1 (27 Aug 2023 22:00)  HR: 80 (28 Aug 2023 11:30) (80 - 120)  BP: 112/60 (28 Aug 2023 11:30) (103/62 - 132/83)  BP(mean): --  RR: 16 (28 Aug 2023 11:30) (15 - 23)  SpO2: 95% (28 Aug 2023 11:30) (95% - 100%)    Parameters below as of 28 Aug 2023 11:30  Patient On (Oxygen Delivery Method): room air        Gen: WD male who opens eyes to name but not verbally communicative    LUE: swelling and erythema involving fingers and hand with extension into the wrist; open wound on dorsum of proximal 3rd digit; open wound on lateral aspect of L 4th digit, proximal area     Neuro: opens eyes to name; no verbal communication       LABS/DIAGNOSTIC TESTS:                        13.6   15.53 )-----------( 329      ( 27 Aug 2023 23:10 )             40.0     WBC Count: 15.53 K/uL (08-27 @ 23:10)      08-27    135  |  103  |  16  ----------------------------<  104<H>  3.6   |  25  |  0.76    Ca    7.8<L>      27 Aug 2023 23:10  Mg     1.9     08-27    TPro  7.1  /  Alb  3.1<L>  /  TBili  0.4  /  DBili  x   /  AST  35  /  ALT  30  /  AlkPhos  67  08-27      Urinalysis Basic - ( 27 Aug 2023 23:10 )    Color: x / Appearance: x / SG: x / pH: x  Gluc: 104 mg/dL / Ketone: x  / Bili: x / Urobili: x   Blood: x / Protein: x / Nitrite: x   Leuk Esterase: x / RBC: x / WBC x   Sq Epi: x / Non Sq Epi: x / Bacteria: x        LIVER FUNCTIONS - ( 27 Aug 2023 23:10 )  Alb: 3.1 g/dL / Pro: 7.1 g/dL / ALK PHOS: 67 U/L / ALT: 30 U/L DA / AST: 35 U/L / GGT: x          Opiate, Urine (08.28.23 @ 12:20)   Opiate, Urine: PositiveAmphetamine, Urine (08.28.23 @ 12:20)   Amphetamine, Urine: Positive      CULTURES: pending      RADIOLOGY  < from: Xray Hand 3 Views, Left (08.27.23 @ 22:38) >  ACC: [id number] EXAM:  XR HAND MIN 3 VIEWS LT   ORDERED BY:  SHEILA MAOS     PROCEDURE DATE:  08/27/2023          INTERPRETATION:  XR HAND 3 VIEWS LEFT    Clinical History: Hand swelling. Rule out foreign body    AP, lateral and oblique view of the lefthand      FINDINGS:    There is no fracture, dislocation or joint effusion.  No degenerative changes.  No radiopaque foreign body.    IMPRESSION:  1.  No fracture.  2.  Moderate soft tissue swelling. No subcutaneous emphysema.

## 2023-08-28 NOTE — PATIENT PROFILE ADULT - VISION (WITH CORRECTIVE LENSES IF THE PATIENT USUALLY WEARS THEM):
unable to assess pt is lethargic/Normal vision: sees adequately in most situations; can see medication labels, newsprint

## 2023-08-28 NOTE — H&P ADULT - PROBLEM SELECTOR PLAN 4
documented to be HIV positive in 2016 w viral load 49k in 5/2016  unknown if he is on medications, follows with clinic or infectious disease specialist    -f/u viral load  -f/u flow cytometry documented to be HIV positive in 2016 w viral load 49k in 5/2016  unknown if he is on medications, follows with clinic or infectious disease specialist    -f/u viral load  -f/u cd4 count documented to be HIV positive in 2016 w viral load 49k in 5/2016  unknown if he is on medications, follows with clinic or infectious disease specialist    -f/u viral load  -f/u cd4 count  -ID Dr. Israel, appreciated documented to be HIV positive in 2016 w viral load 49k in 5/2016  unknown if he is on medications, follows with clinic or infectious disease specialist--pt is noncooperative on interview and just mumbles "no" if he says he is on meds    -f/u repeat HIV ab test  -f/u viral load  -f/u cd4 count  -ID Dr. Israel, appreciated pt has long documented hx of substance abuse, including amphetamines (utox pos in 2/2023), admitted to crystal meth use in 2016 as per chart review, has hx of rhabdo from meth use    -utox pos for amphetamines and opiates (was given morphine in ED prior to drawing urine sample)  -SBIRT consult, appreciated

## 2023-08-28 NOTE — ED ADULT NURSE REASSESSMENT NOTE - NS ED NURSE REASSESS COMMENT FT1
PT resting at time of Noted and has been intermittently non compliant with care but re-directable. 1:1 remains in place for safety

## 2023-08-28 NOTE — H&P ADULT - PROBLEM SELECTOR PLAN 3
pt has long documented hx of substance abuse, including amphetamines (utox pos in 2/2023)    -f/u utox  -SBIRT consult, appreciated pt has long documented hx of substance abuse, including amphetamines (utox pos in 2/2023), admitted to crystal meth use in 2016 as per chart review, has hx of rhabdo from meth use    -f/u utox  -SBIRT consult, appreciated pt has long documented hx of substance abuse, including amphetamines (utox pos in 2/2023), admitted to crystal meth use in 2016 as per chart review, has hx of rhabdo from meth use    -utox pos for amphetamines and opiates (was given morphine in ED prior to drawing urine sample)  -SBIRT consult, appreciated initially pt was altered due to drug intoxication, then received procedural sedation  was observed to be waking up throughout the course of the day

## 2023-08-28 NOTE — CONSULT NOTE ADULT - ASSESSMENT
26 yo M w polysubstance use disorder, HIV+, unspecified past psych hx w documented hallucinations/psychosis, presented with injury to his Left hand. Pt was reportedly intoxicated on unspecified substance, and placed a metal ring? or device on his L middle and L ring fingers 2 days prior to ED presentation. + cellulitis L hand. Metal ring removed in the ED. Pt sedated, unable to give further Hx.    #Cellulitis L hand-- G+cocci most common cause   --cont. vanco for now  --f/u vanco trough around the 3rd dose  -- send any purulent drainage for Cx    #HIV+-- incomplete data re status and meds  --obtain HIV testing  --obtain viral load, CD4 count    #Substance abuse  --positive testing for opiates and amphetamine  --manage for possible withdrawal    #Encephalopathy-- initially described as being intoxicated on unspecified substance, then subsequently sedated b/o agitation.

## 2023-08-28 NOTE — CHART NOTE - NSCHARTNOTEFT_GEN_A_CORE
Pt noted to be acutely agitated, spat in PCA's face. On assessment pt said he wanted to get his pants so he could "shit in them and leave." When asked why he was here in the hospital he said "to masturbate." He did not demonstrate that he had capacity to leave against medical advice. Haldol ordered stat. Pt noted to be acutely agitated, spat in PCA's face. On assessment pt said he wanted to get his pants so he could "shit in them and leave." When asked why he was here in the hospital he said "to masturbate." He did not demonstrate that he had capacity to leave against medical advice. He was offered food, asked if he was hungry, he declined. Haldol ordered stat.

## 2023-08-28 NOTE — CONSULT NOTE ADULT - TIME BILLING
Chart review, including notes, labs, imaging; limited bedside evaluation due to pt's MS; discussion re Dx/management of LUE cellulitis with Dr. Whitehead.

## 2023-08-28 NOTE — H&P ADULT - PROBLEM SELECTOR PLAN 5
lovenox hold chemical ppx at this time hold chemical ppx at this time, pt is young and ambulatory documented to be HIV positive in 2016 w viral load 49k in 5/2016  unknown if he is on medications, follows with clinic or infectious disease specialist--pt is noncooperative on interview and just mumbles "no" if he says he is on meds    -f/u repeat HIV ab test  -f/u viral load  -f/u cd4 count  -ID Dr. Israel, appreciated

## 2023-08-28 NOTE — H&P ADULT - ASSESSMENT
26 yo M w polysubstance use disorder, HIV+, undet hx of psychosis, admitted for hand injury, and possible superimposed cellulitis. 28 yo M w polysubstance use disorder, HIV+, unspecified hx of psychosis, admitted for hand injury, and possible superimposed cellulitis.    PRIMARY TEAM KINDLY CONFIRM MEDS IN A.M.--patient was sedated and unable to provide info on meds 26 yo M w polysubstance use disorder, HIV+, unspecified hx of psychosis, admitted for hand injury, and possible superimposed cellulitis.

## 2023-08-28 NOTE — CHART NOTE - NSCHARTNOTEFT_GEN_A_CORE
Collateral information obtained fro pt's sister Ms. Conor Felix. She says that she lives in [place], and does not know where her brother currently stays. She says he has been unemployed, has HIV, and has a history of drug use, but was not able to provide much further collateral information about his health and day to day life. She is to remain the primary point of contact in case family needs to be reached. Collateral information obtained fro pt's sister Ms. Conor Felix. She says that she lives in [place], and does not know where her brother currently stays. She says he has been unemployed, has HIV, and has a history of drug use, but was not able to provide much further collateral information about his health and day to day life. She is to remain the primary point of contact in case family needs to be reached. I informed her that the pt was currently stable and resting comfortably. All questions answered.

## 2023-08-28 NOTE — H&P ADULT - NSHPPHYSICALEXAM_GEN_ALL_CORE
T(C): 37 (08-28-23 @ 11:30), Max: 37.8 (08-27-23 @ 22:00)  HR: 80 (08-28-23 @ 11:30) (80 - 120)  BP: 112/60 (08-28-23 @ 11:30) (103/62 - 132/83)  RR: 16 (08-28-23 @ 11:30) (15 - 23)  SpO2: 95% (08-28-23 @ 11:30) (95% - 100%)    CONSTITUTIONAL: casually groomed, no apparent distress, somnolent    HEENT: normotramuatic, acephalic. Oral mucosa with moist membranes. No external nasal lesions; nasal mucosa not inflamed; no pharyngeal injection or exudates.               Neck: supple, symmetric and without tracheal deviation    RESPIRATORY: No respiratory distress, no use of accessory muscles; CTA b/l, no wheezes, rales or rhonchi    CARDIOVASCULAR: RRRR, +S1S2, no murmurs, no rubs, no gallops; no JVD; no peripheral edema  	Vascular: carotid pulse palpable, radial pulse palpable    GASTROINTESTINAL: +BS, Soft, non tender, non distended, no rebound, no guarding; No palpable masses    MUSCULOSKELETAL: No digital clubbing or cyanosis; examination of the (head/neck, spine/ribs/pelvis, RUE, LUE, RLE, LLE) without misalignment, no spinal tenderness  HAND: Circumferenial ulcer on proximal phalanx of L middle finger approx 2 cm, with black base, and 1 cm on ring fingers with white base, hand swollen, erythmetaous and tender to touch. Pt noted to be moving all fingers of the L hand    SKIN: no rashes, no track marks on extremities    NEUROLOGIC: follows commands, able to say name, but otherwise somnolent    LYMPHATIC: No cervical LAD or tenderness    GENITOURINARY: No suprapubic tenderness, no CVA tenderness T(C): 37 (08-28-23 @ 11:30), Max: 37.8 (08-27-23 @ 22:00)  HR: 80 (08-28-23 @ 11:30) (80 - 120)  BP: 112/60 (08-28-23 @ 11:30) (103/62 - 132/83)  RR: 16 (08-28-23 @ 11:30) (15 - 23)  SpO2: 95% (08-28-23 @ 11:30) (95% - 100%)    CONSTITUTIONAL: casually groomed, no apparent distress, somnolent    HEENT: normotramuatic, acephalic. Oral mucosa with moist membranes. No external nasal lesions; nasal mucosa not inflamed; no pharyngeal injection or exudates.               Neck: supple, symmetric and without tracheal deviation    RESPIRATORY: No respiratory distress, no use of accessory muscles; CTA b/l, no wheezes, rales or rhonchi    CARDIOVASCULAR: RRRR, +S1S2, no murmurs, no rubs, no gallops; no JVD; no peripheral edema  	Vascular: carotid pulse palpable, radial pulse palpable    GASTROINTESTINAL: +BS, Soft, non tender, non distended, no rebound, no guarding; No palpable masses    MUSCULOSKELETAL: No digital clubbing or cyanosis; examination of the (head/neck, spine/ribs/pelvis, RUE, LUE, RLE, LLE) without misalignment, no spinal tenderness  HAND: Circumferenial ulcer on proximal phalanx of L middle finger approx 2 cm, with black base, and 1 cm on ring fingers with white base, hand swollen, erythmetaous and tender to touch. Pt noted to be moving all fingers of the L hand, hand is warm to touch    SKIN: no rashes, no track marks on extremities    NEUROLOGIC: follows commands, able to say name, but otherwise somnolent    LYMPHATIC: No cervical LAD or tenderness    GENITOURINARY: No suprapubic tenderness, no CVA tenderness T(C): 37 (08-28-23 @ 11:30), Max: 37.8 (08-27-23 @ 22:00)  HR: 80 (08-28-23 @ 11:30) (80 - 120)  BP: 112/60 (08-28-23 @ 11:30) (103/62 - 132/83)  RR: 16 (08-28-23 @ 11:30) (15 - 23)  SpO2: 95% (08-28-23 @ 11:30) (95% - 100%)    CONSTITUTIONAL: casually groomed, no apparent distress,  noncooperative with answering questions    HEENT: normotramuatic, acephalic. Oral mucosa with moist membranes. EOMI, nonicteric sclera. No external nasal lesions; nasal mucosa not inflamed; no pharyngeal injection or exudates.               Neck: supple, symmetric and without tracheal deviation    RESPIRATORY: No respiratory distress, no use of accessory muscles; CTA b/l, no wheezes, rales or rhonchi    CARDIOVASCULAR: RRRR, +S1S2, no murmurs, no rubs, no gallops; no JVD; no peripheral edema  	Vascular: carotid pulse palpable, radial pulse palpable    GASTROINTESTINAL: +BS, Soft, non tender, non distended, no rebound, no guarding; No palpable masses    MUSCULOSKELETAL: No digital clubbing or cyanosis; examination of the (head/neck, spine/ribs/pelvis, RUE, LUE, RLE, LLE) without misalignment, no spinal tenderness  HAND: Circumferenial ulcer on proximal phalanx of L middle finger approx 2 cm, with black base, and 1 cm on ring fingers with white base, hand swollen, erythmetaous and tender to touch. Pt noted to be moving all fingers of the L hand, hand is warm to touch    SKIN: no rashes, no track marks on extremities    NEUROLOGIC: follows commands, strength intact in UE and LE    PSYCH: poor insight, says "can  I go home now?" and when explained that he has infection and needs to stay in hospital, lay back abruptly and closed eyes and refused to answer further questions    LYMPHATIC: No cervical LAD or tenderness    GENITOURINARY: No suprapubic tenderness, no CVA tenderness

## 2023-08-28 NOTE — H&P ADULT - ATTENDING COMMENTS
#L-hand cellulitis   #ANN MARIE 2/2 amphetamine use   #Hx of polysubstance abuse   #HIV+    27yM with PMH of polysubstance abuse, unspecified psych disorder, and HIV, who presented from home with left hand injury 2/2 metal rings stuck on his fingers, and with altered mentation. Rings were removed using a dremel from the [employer]. Prior attempts with ring cutter and  were unsuccessful. Patient required procedural sedation with ketamine, he was also given Ativan and Haldol for agitation. Admitted to medicine for left hand cellulitis.   Patient seen at bedside. Sedated from medications given earlier. Arousable, sat up and asked when he was going home, when told that he will be admitted, patient fell back asleep. Unable to provide history. Per EMR search, it was found that patient has a history of HIV, crystal meth and amphetamine use.     PE: as above; somnolent, unable to follow commands  Labs: reviewed by me - CBC, BMP, LFTs, U. tox   Imaging: reviewed by me - XR L-hand    -XR L-hand #L-hand cellulitis   #ANN MARIE 2/2 amphetamine use   #Hx of polysubstance abuse   #HIV+    27yM with PMH of polysubstance abuse, unspecified psych disorder, and HIV, who presented from home with left hand injury 2/2 metal rings stuck on his fingers, and with altered mentation. Rings were removed using a dremel from the [employer]. Prior attempts with ring cutter and  were unsuccessful. Patient required procedural sedation with ketamine, he was also given Ativan and Haldol for agitation. Admitted to medicine for left hand cellulitis.   Patient seen at bedside. Sedated from medications given earlier. Arousable, sat up and asked when he was going home, when told that he will be admitted, patient fell back asleep. Unable to provide history. Per EMR search, it was found that patient has a history of HIV, crystal meth and amphetamine use.     PE: as above; somnolent, unable to follow commands  Labs: reviewed by me - CBC, BMP, LFTs, U. tox   Imaging: reviewed by me - XR L-hand    -XR L-hand showed moderate swelling, negative for fracture  -Utox positive for amphetamine  -Leukocytosis and mild tachycardia, with source of infection   -s/p Zosyn x2; will continue vancomycin x1-2 more days, then transition to PO; discussed with ID Dr. Israel   -ESR/CRP pending   -IVF   -Monitor patient mentation  -F/U HIV viral load, CD4 test; no prior record of patient being on medications  -DVT ppx: ambulatory

## 2023-08-28 NOTE — PATIENT PROFILE ADULT - FALL HARM RISK - HARM RISK INTERVENTIONS
Assistance with ambulation/Assistance OOB with selected safe patient handling equipment/Communicate Risk of Fall with Harm to all staff/Discuss with provider need for PT consult/Monitor for mental status changes/Monitor gait and stability/Move patient closer to nurses' station/Reinforce activity limits and safety measures with patient and family/Reorient to person, place and time as needed/Sit up slowly, dangle for a short time, stand at bedside before walking/Tailored Fall Risk Interventions/Toileting schedule using arm’s reach rule for commode and bathroom/Use of alarms - bed, chair and/or voice tab/Visual Cue: Yellow wristband and red socks/Bed in lowest position, wheels locked, appropriate side rails in place/Call bell, personal items and telephone in reach/Instruct patient to call for assistance before getting out of bed or chair/Non-slip footwear when patient is out of bed/[place] to call system/Physically safe environment - no spills, clutter or unnecessary equipment/Purposeful Proactive Rounding/Room/bathroom lighting operational, light cord in reach

## 2023-08-28 NOTE — H&P ADULT - NSICDXPASTMEDICALHX_GEN_ALL_CORE_FT
PAST MEDICAL HISTORY:  Polysubstance use disorder      PAST MEDICAL HISTORY:  HIV infection     Polysubstance use disorder

## 2023-08-28 NOTE — H&P ADULT - PROBLEM SELECTOR PLAN 1
does not meet SIRS/sepsis criteria  pt was intoxicated and had caused prolonged mechanical injury to hand from placing ringlike device for 2-3 days on hand  was removed in the ED, required procedural sedation  s/p zosyn in ED  low suspicion for compartment syndrome at this time    -f/u cx  -f/u formal Hand xray read  -consider vascular or hand consult pending Xray  -cont zosyn for now given pt's HIV hx, polysubstance abuse, and unknown risk factors, deescalate as indicated does not meet SIRS/sepsis criteria  pt was intoxicated and had caused prolonged mechanical injury to hand from placing ringlike device for 2-3 days on hand  was removed in the ED, required procedural sedation  low suspicion for compartment syndrome at this time  s/p zosyn in ED    -f/u cx  -f/u formal Hand xray read  -consider vascular or hand consult pending Xray  -cont zosyn for now given pt's HIV hx, polysubstance abuse, and unknown risk factors, deescalate as indicated does not meet SIRS/sepsis criteria  pt was intoxicated and had caused prolonged mechanical injury to hand from placing ringlike device for 2-3 days on hand  was removed in the ED, required procedural sedation (ketamine, ativan) and pain relief (IV morphine)  low suspicion for compartment syndrome at this time on physical exam  s/p zosyn in ED  WBC 15 on admission, likely combination of reactive + infection    -f/u cx  -f/u formal Hand xray read  -consider hand consult pending Xray read  -cont zosyn for now given pt's HIV hx, polysubstance abuse, and unknown risk factors, deescalate as indicated does not meet SIRS/sepsis criteria  pt was intoxicated and had caused prolonged mechanical injury to hand from placing ringlike device for 2-3 days on hand  was removed in the ED, required procedural sedation (ketamine, ativan) and pain relief (IV morphine)  low suspicion for compartment syndrome at this time on physical exam  s/p zosyn in ED  WBC 15 on admission, likely combination of reactive + infection    -f/u cx  -f/u formal Hand xray read  -consider hand consult pending Xray read  -cont zosyn for now given pt's HIV hx, polysubstance abuse, and unknown risk factors, deescalate as indicated  -ID Dr. Israel, appreciated does not meet SIRS/sepsis criteria  pt was intoxicated and had caused prolonged mechanical injury to hand from placing ringlike device for 2-3 days on hand  was removed in the ED, required procedural sedation (ketamine, ativan) and pain relief (IV morphine)  low suspicion for compartment syndrome at this time on physical exam  s/p zosyn in ED  WBC 15 on admission, likely combination of reactive + infection    -f/u cx  -f/u formal Hand xray read  -consider hand consult pending Xray read  -stop zosyn, start vancomycin  -ID Dr. Israel, appreciated possibly meets SIRS/sepsis criteria given WBC 15 and HR elevated to 110, source is wound on hand, however pt was in pain and was intoxicated on drugs  pt was intoxicated and had caused prolonged mechanical injury to hand from placing ringlike device for 2-3 days on hand  was removed in the ED, required procedural sedation (ketamine, ativan) and pain relief (IV morphine)  low suspicion for compartment syndrome at this time on physical exam  s/p zosyn in ED  -lactate WNL    -f/u cx  -f/u formal Hand xray read  -consider hand consult pending Xray read  -stop zosyn, start vancomycin., can deescalate to PO over the next 2 days  -ID Dr. Israel, appreciated  -f/u ESR, CRP  -LR at 100 cc/hr  -wound care consult possibly meets SIRS/sepsis criteria given WBC 15 and HR elevated to 110, source is wound on hand, however pt was in pain and was intoxicated on drugs  pt was intoxicated and had caused prolonged mechanical injury to hand from placing ringlike device for 2-3 days on hand  was removed in the ED, required procedural sedation (ketamine, ativan) and pain relief (IV morphine)  low suspicion for compartment syndrome at this time on physical exam  s/p zosyn in ED  -lactate WNL  -XR hand neg for gas and acute fracture, pos for subcutaneous swelling    -f/u cx  -consider hand consult pending Xray read  -stop zosyn, start vancomycin., can deescalate to PO over the next 2 days  -ID Dr. Israel, appreciated  -f/u ESR, CRP  -LR at 100 cc/hr  -wound care consult possibly meets SIRS/sepsis criteria given WBC 15 and HR elevated to 110, source is wound on hand, however pt was in pain and was intoxicated on drugs  pt was intoxicated and had caused prolonged mechanical injury to hand from placing ringlike device for 2-3 days on hand  was removed in the ED, required procedural sedation (ketamine, ativan) and pain relief (IV morphine)  low suspicion for compartment syndrome at this time on physical exam  s/p zosyn in ED  -lactate WNL  -XR hand neg for gas and acute fracture, pos for subcutaneous swelling    -f/u cx  -stop zosyn, start vancomycin., can deescalate to PO over the next 2 days  -ID Dr. Israel, appreciated  -f/u ESR, CRP  -LR at 100 cc/hr  -wound care consult

## 2023-08-28 NOTE — H&P ADULT - HISTORY OF PRESENT ILLNESS
26 yo M w polysubstance use disorder, HIV+, with unspecified past psych hx w documented hallucinations/psychosis, presented with injury to his Left hand. Pt was reportedly intoxicated on unspecified substance, and placed a metal ring? or device on his L middle and L ring fingers 2 days prior to ED presentation. As per chart review, pt does not know why he placed the object on his hand, and was endorsing pain. Pt required procedural sedation w ketamine, ativan, and pain relief w morphine to facilitate removal of the device.    Patient was still sedated at time of interview and unable to provide hx and ROS.     ED course:  Initial vitals: T 98, HR 96, /86, RR 18, 96%RA  s/p 30 mg IV ketaminex2, 2 mg IV morphine, 2 mg IV ativan  s/p TDAP   s/p IV zosyn x2   28 yo M w polysubstance use disorder (including crystal meth w hx of rhabdo in 2016 from meth use), HIV+, with unspecified past psych hx w documented hallucinations/psychosis, presented with injury to his Left hand. Pt was reportedly intoxicated on unspecified substance, and placed a metal ring? or device on his L middle and L ring fingers 2 days prior to ED presentation. As per chart review, pt does not know why he placed the object on his hand, and was endorsing pain. Pt required procedural sedation w ketamine, ativan, and pain relief w morphine to facilitate removal of the device w lilli by ERENDIRA.    Patient was still sedated at time of interview and unable to provide hx and ROS.     ED course:  Initial vitals: T 98, HR 96, /86, RR 18, 96%RA  s/p 30 mg IV ketaminex2, 2 mg IV morphine, 2 mg IV ativan  s/p TDAP   s/p IV zosyn x2

## 2023-08-29 VITALS
SYSTOLIC BLOOD PRESSURE: 113 MMHG | OXYGEN SATURATION: 97 % | RESPIRATION RATE: 18 BRPM | DIASTOLIC BLOOD PRESSURE: 67 MMHG | TEMPERATURE: 98 F | HEART RATE: 75 BPM

## 2023-08-29 LAB
4/8 RATIO: 1.34 RATIO — SIGNIFICANT CHANGE UP (ref 0.9–3.6)
ABS CD8: 274 CELLS/UL — SIGNIFICANT CHANGE UP (ref 142–740)
ALBUMIN SERPL ELPH-MCNC: 2.8 G/DL — LOW (ref 3.5–5)
ALP SERPL-CCNC: 56 U/L — SIGNIFICANT CHANGE UP (ref 40–120)
ALT FLD-CCNC: 24 U/L DA — SIGNIFICANT CHANGE UP (ref 10–60)
ANION GAP SERPL CALC-SCNC: 6 MMOL/L — SIGNIFICANT CHANGE UP (ref 5–17)
AST SERPL-CCNC: 33 U/L — SIGNIFICANT CHANGE UP (ref 10–40)
BASOPHILS # BLD AUTO: 0.04 K/UL — SIGNIFICANT CHANGE UP (ref 0–0.2)
BASOPHILS NFR BLD AUTO: 0.6 % — SIGNIFICANT CHANGE UP (ref 0–2)
BILIRUB SERPL-MCNC: 0.4 MG/DL — SIGNIFICANT CHANGE UP (ref 0.2–1.2)
BUN SERPL-MCNC: 12 MG/DL — SIGNIFICANT CHANGE UP (ref 7–18)
CALCIUM SERPL-MCNC: 8.6 MG/DL — SIGNIFICANT CHANGE UP (ref 8.4–10.5)
CD16+CD56+ CELLS NFR BLD: 12 % — SIGNIFICANT CHANGE UP (ref 5–23)
CD16+CD56+ CELLS NFR SPEC: 109 CELLS/UL — SIGNIFICANT CHANGE UP (ref 71–410)
CD19 BLASTS SPEC-ACNC: 10 % — SIGNIFICANT CHANGE UP (ref 6–24)
CD19 BLASTS SPEC-ACNC: 87 CELLS/UL — SIGNIFICANT CHANGE UP (ref 84–469)
CD3 BLASTS SPEC-ACNC: 665 CELLS/UL — LOW (ref 672–1870)
CD3 BLASTS SPEC-ACNC: 77 % — SIGNIFICANT CHANGE UP (ref 59–83)
CD4 %: 43 % — SIGNIFICANT CHANGE UP (ref 30–62)
CD8 %: 32 % — SIGNIFICANT CHANGE UP (ref 12–36)
CHLORIDE SERPL-SCNC: 106 MMOL/L — SIGNIFICANT CHANGE UP (ref 96–108)
CO2 SERPL-SCNC: 25 MMOL/L — SIGNIFICANT CHANGE UP (ref 22–31)
CREAT SERPL-MCNC: 0.76 MG/DL — SIGNIFICANT CHANGE UP (ref 0.5–1.3)
EGFR: 125 ML/MIN/1.73M2 — SIGNIFICANT CHANGE UP
EOSINOPHIL # BLD AUTO: 0.06 K/UL — SIGNIFICANT CHANGE UP (ref 0–0.5)
EOSINOPHIL NFR BLD AUTO: 0.8 % — SIGNIFICANT CHANGE UP (ref 0–6)
GLUCOSE SERPL-MCNC: 154 MG/DL — HIGH (ref 70–99)
HCT VFR BLD CALC: 41 % — SIGNIFICANT CHANGE UP (ref 39–50)
HGB BLD-MCNC: 13.6 G/DL — SIGNIFICANT CHANGE UP (ref 13–17)
IMM GRANULOCYTES NFR BLD AUTO: 0.3 % — SIGNIFICANT CHANGE UP (ref 0–0.9)
LYMPHOCYTES # BLD AUTO: 1.19 K/UL — SIGNIFICANT CHANGE UP (ref 1–3.3)
LYMPHOCYTES # BLD AUTO: 16.6 % — SIGNIFICANT CHANGE UP (ref 13–44)
MAGNESIUM SERPL-MCNC: 2.3 MG/DL — SIGNIFICANT CHANGE UP (ref 1.6–2.6)
MCHC RBC-ENTMCNC: 31 PG — SIGNIFICANT CHANGE UP (ref 27–34)
MCHC RBC-ENTMCNC: 33.2 GM/DL — SIGNIFICANT CHANGE UP (ref 32–36)
MCV RBC AUTO: 93.4 FL — SIGNIFICANT CHANGE UP (ref 80–100)
MONOCYTES # BLD AUTO: 0.44 K/UL — SIGNIFICANT CHANGE UP (ref 0–0.9)
MONOCYTES NFR BLD AUTO: 6.1 % — SIGNIFICANT CHANGE UP (ref 2–14)
NEUTROPHILS # BLD AUTO: 5.41 K/UL — SIGNIFICANT CHANGE UP (ref 1.8–7.4)
NEUTROPHILS NFR BLD AUTO: 75.6 % — SIGNIFICANT CHANGE UP (ref 43–77)
NRBC # BLD: 0 /100 WBCS — SIGNIFICANT CHANGE UP (ref 0–0)
PHOSPHATE SERPL-MCNC: 2.3 MG/DL — LOW (ref 2.5–4.5)
PLATELET # BLD AUTO: 349 K/UL — SIGNIFICANT CHANGE UP (ref 150–400)
POTASSIUM SERPL-MCNC: 4 MMOL/L — SIGNIFICANT CHANGE UP (ref 3.5–5.3)
POTASSIUM SERPL-SCNC: 4 MMOL/L — SIGNIFICANT CHANGE UP (ref 3.5–5.3)
PROT SERPL-MCNC: 7 G/DL — SIGNIFICANT CHANGE UP (ref 6–8.3)
RBC # BLD: 4.39 M/UL — SIGNIFICANT CHANGE UP (ref 4.2–5.8)
RBC # FLD: 12.9 % — SIGNIFICANT CHANGE UP (ref 10.3–14.5)
SODIUM SERPL-SCNC: 137 MMOL/L — SIGNIFICANT CHANGE UP (ref 135–145)
T-CELL CD4 SUBSET PNL BLD: 367 CELLS/UL — LOW (ref 489–1457)
WBC # BLD: 7.16 K/UL — SIGNIFICANT CHANGE UP (ref 3.8–10.5)
WBC # FLD AUTO: 7.16 K/UL — SIGNIFICANT CHANGE UP (ref 3.8–10.5)

## 2023-08-29 PROCEDURE — 99231 SBSQ HOSP IP/OBS SF/LOW 25: CPT

## 2023-08-29 PROCEDURE — 99233 SBSQ HOSP IP/OBS HIGH 50: CPT | Mod: GC

## 2023-08-29 RX ORDER — SODIUM,POTASSIUM PHOSPHATES 278-250MG
1 POWDER IN PACKET (EA) ORAL ONCE
Refills: 0 | Status: COMPLETED | OUTPATIENT
Start: 2023-08-29 | End: 2023-08-29

## 2023-08-29 RX ADMIN — Medication 250 MILLIGRAM(S): at 11:08

## 2023-08-29 RX ADMIN — Medication 1 PACKET(S): at 15:04

## 2023-08-29 RX ADMIN — SODIUM CHLORIDE 1000 MILLILITER(S): 9 INJECTION, SOLUTION INTRAVENOUS at 00:30

## 2023-08-29 NOTE — PROGRESS NOTE ADULT - SUBJECTIVE AND OBJECTIVE BOX
Subjective/Objective:      MEDS  acetaminophen     Tablet .. 650 milliGRAM(s) Oral every 6 hours PRN  haloperidol    Injectable 5 milliGRAM(s) IntraMuscular every 6 hours PRN  ondansetron Injectable 4 milliGRAM(s) IV Push every 8 hours PRN  vancomycin  IVPB 750 milliGRAM(s) IV Intermittent every 12 hours (D2)      PHYSICAL EXAM:    Vital Signs Last 24 Hrs  T(C): 36.6 (29 Aug 2023 09:58), Max: 37 (28 Aug 2023 11:30)  T(F): 97.9 (29 Aug 2023 09:58), Max: 98.6 (28 Aug 2023 11:30)  HR: 69 (29 Aug 2023 09:58) (61 - 81)  BP: 115/72 (29 Aug 2023 09:58) (89/52 - 115/72)  BP(mean): --  RR: 18 (29 Aug 2023 09:58) (16 - 19)  SpO2: 100% (29 Aug 2023 09:58) (95% - 100%)    Parameters below as of 29 Aug 2023 09:58  Patient On (Oxygen Delivery Method): room air        GEN:    HEENT:    CHEST/Respiratory:    Cardiovascular:    Abdomen:    Genitourinary:    Extremities:     Neurological:    Skin:      LABS/DIAGNOSTIC TESTS                            13.6   15.53 )-----------( 329      ( 27 Aug 2023 23:10 )             40.0       WBC Count: 15.53 K/uL (08-27 @ 23:10)      08-27    135  |  103  |  16  ----------------------------<  104<H>  3.6   |  25  |  0.76    Ca    7.8<L>      27 Aug 2023 23:10  Mg     1.9     08-27    TPro  7.1  /  Alb  3.1<L>  /  TBili  0.4  /  DBili  x   /  AST  35  /  ALT  30  /  AlkPhos  67  08-27      CULTURES          RADIOLOGY               Subjective/Objective: Pt more awake today. C/O pain in L hand.       MEDS  acetaminophen     Tablet .. 650 milliGRAM(s) Oral every 6 hours PRN  haloperidol    Injectable 5 milliGRAM(s) IntraMuscular every 6 hours PRN  ondansetron Injectable 4 milliGRAM(s) IV Push every 8 hours PRN  vancomycin  IVPB 750 milliGRAM(s) IV Intermittent every 12 hours (D2)      PHYSICAL EXAM:    Vital Signs Last 24 Hrs  T(C): 36.6 (29 Aug 2023 09:58), Max: 37 (28 Aug 2023 11:30)  T(F): 97.9 (29 Aug 2023 09:58), Max: 98.6 (28 Aug 2023 11:30)  HR: 69 (29 Aug 2023 09:58) (61 - 81)  BP: 115/72 (29 Aug 2023 09:58) (89/52 - 115/72)  BP(mean): --  RR: 18 (29 Aug 2023 09:58) (16 - 19)  SpO2: 100% (29 Aug 2023 09:58) (95% - 100%)    Parameters below as of 29 Aug 2023 09:58  Patient On (Oxygen Delivery Method): room air        GEN: WD male who is easily arousable      Extremities: LUE: swelling and erythema involving fingers and hand with extension into the wrist; open wound on dorsum of proximal 3rd digit; open wound on lateral aspect of L 4th digit, proximal area; + tenderness to palpation of fingers and hand    Neurological: easily arousable; oriented to person, place, time    Skin: see above      LABS/DIAGNOSTIC TESTS                        13.6   15.53 )-----------( 329      ( 27 Aug 2023 23:10 )             40.0       WBC Count: 15.53 K/uL (08-27 @ 23:10)      08-27    135  |  103  |  16  ----------------------------<  104<H>  3.6   |  25  |  0.76    Ca    7.8<L>      27 Aug 2023 23:10  Mg     1.9     08-27    TPro  7.1  /  Alb  3.1<L>  /  TBili  0.4  /  DBili  x   /  AST  35  /  ALT  30  /  AlkPhos  67  08-27      CULTURES          RADIOLOGY

## 2023-08-29 NOTE — DISCHARGE NOTE PROVIDER - NSDCCPCAREPLAN_GEN_ALL_CORE_FT
PRINCIPAL DISCHARGE DIAGNOSIS  Diagnosis: Cellulitis  Assessment and Plan of Treatment: You came into the Emergency room due to a ring device stuck on your right hand. They were able to remove the ring but you had severe swelling and a small ulcer on your middle finger. We did an x-ray which did not show any broken bones in your hand.  We diagnosed you with cellulitis which an infection of your skin and gave you some antibiotics in the hospital. Please continue to take the oral antibiotic CLINDOMYCIN 300 mg three times a day for the next FIVE DAYS.   Please follow up with your PCP.      SECONDARY DISCHARGE DIAGNOSES  Diagnosis: Polysubstance use disorder  Assessment and Plan of Treatment: You have a history of drug use. Your urine came back positive for amphetamines and opiates. Please try to follow up with a psychiatrist and your PCP to help you avoid these drugs as they are effecting your health.    Diagnosis: HIV positive  Assessment and Plan of Treatment: You have a history of HIV. Please follow up with the HIV clinic that is shown in the outpatient follow section.     PRINCIPAL DISCHARGE DIAGNOSIS  Diagnosis: Cellulitis  Assessment and Plan of Treatment: You came into the Emergency room due to a ring device stuck on your right hand. They were able to remove the ring but you had severe swelling and a small ulcer on your middle finger. We did an x-ray which did not show any broken bones in your hand.  We diagnosed you with cellulitis which an infection of your skin and gave you some antibiotics in the hospital. Please continue to take the oral antibiotic CLINDAMYCIN 300 mg three times a day for the next FIVE DAYS.   Please follow up with your PCP.      SECONDARY DISCHARGE DIAGNOSES  Diagnosis: Polysubstance use disorder  Assessment and Plan of Treatment: You have a history of drug use. Your urine came back positive for amphetamines and opiates. Please try to follow up with a psychiatrist and your PCP to help you avoid these drugs as they are effecting your health.    Diagnosis: HIV positive  Assessment and Plan of Treatment: You have a history of HIV. Please follow up with the HIV clinic that is shown in the outpatient follow section.

## 2023-08-29 NOTE — PROGRESS NOTE ADULT - ASSESSMENT
28 yo M w polysubstance use disorder, HIV+, unspecified past psych hx w documented hallucinations/psychosis, presented with injury to his Left hand. Pt was reportedly intoxicated on unspecified substance, and placed a metal ring? or device on his L middle and L ring fingers 2 days prior to ED presentation. + cellulitis L hand. Metal ring removed in the ED. Pt more alert today and wants to leave. If patient refuses to stay, wd change Ab to clindamycin 300mg p.o. QID x5d.    #Cellulitis L hand-- G+cocci most common cause (see above)     #HIV+-- incomplete data re status and meds  --obtain HIV testing  --obtain viral load, CD4 count    #Substance abuse  --positive testing for opiates and amphetamine  --manage for possible withdrawal    #Encephalopathy-- improving.    Chart reviewed-- no new labs today. Re-evaluated pt at the bedside. Discussed Dx and management of L hand cellulitis with Dr. Cavanaugh.        26 yo M w polysubstance use disorder, HIV+, unspecified past psych hx w documented hallucinations/psychosis, presented with injury to his Left hand. Pt was reportedly intoxicated on unspecified substance, and placed a metal ring? or device on his L middle and L ring fingers 2 days prior to ED presentation. + cellulitis L hand. Metal ring removed in the ED. Pt more alert today; WBC normal and remains afebrile. Pt wants to leave. If patient refuses to stay, wd change Ab to clindamycin 300mg p.o. QID x5d.    #Cellulitis L hand-- G+cocci most common cause (see above)     #HIV+-- incomplete data re status and meds  --obtain HIV testing  --obtain viral load, CD4 count    #Substance abuse  --positive testing for opiates and amphetamine  --manage for possible withdrawal    #Encephalopathy-- improving.    Chart reviewed, including labs and notes. Re-evaluated pt at the bedside. Discussed Dx and management of L hand cellulitis with Dr. Cavanaugh.

## 2023-08-29 NOTE — DISCHARGE NOTE PROVIDER - HOSPITAL COURSE
28 yo M w polysubstance use disorder (including crystal meth w hx of rhabdo in 2016 from meth use), HIV+, with unspecified past psych hx w documented hallucinations/psychosis, presented with injury to his Left hand. Pt was reportedly intoxicated on unspecified substance, and placed a metal ring or device on his L middle and L ring fingers 2 days prior to ED presentation. Pt. required procedural sedation w ketamine, ativan, and pain relief w morphine to facilitate removal of the device w lilli by ERENDIRA. Pt. Received TDAP and zosyn for 2 doses. Pt. being discharged on Clindomycin 300mg TID for 5 days.      28 yo M w polysubstance use disorder (including crystal meth w hx of rhabdo in 2016 from meth use), HIV+, with unspecified past psych hx w documented hallucinations/psychosis, presented with injury to his Left hand. Pt was reportedly intoxicated on unspecified substance, and placed a metal ring or device on his L middle and L ring fingers 2 days prior to ED presentation. Pt. required procedural sedation w ketamine, ativan, and pain relief w morphine to facilitate removal of the device w lilli by ERENDIRA. X-ray  showed no fracture and jus mild swelling. Pt. Received TDAP and zosyn for 2 doses. Pt. being discharged on Clindamycin 300mg TID for 5 days.      26 yo M w polysubstance use disorder (including crystal meth w hx of rhabdo in 2016 from meth use), HIV+, with unspecified past psych hx w documented hallucinations/psychosis, presented with injury to his Left hand. Pt was reportedly intoxicated on unspecified substance, and placed a metal ring or device on his L middle and L ring fingers 2 days prior to ED presentation. Pt required procedural sedation w ketamine, ativan, and pain relief w morphine to facilitate removal of the device w lilli by ERENDIRA. X-ray  showed no fracture and just mild swelling. Pt received TDAP and zosyn for 2 doses. Pt being discharged on Clindamycin 300mg TID for 5 days.     Given patient's improved clinical status and current hemodynamic stability, decision was made to discharge. Discussed with attending. Please refer to patient's complete medical chart with documents for a full hospital course, for this is only a brief summary.       28 yo M w polysubstance use disorder (including crystal meth w hx of rhabdo in 2016 from meth use), HIV+, with unspecified past psych hx w documented hallucinations/psychosis, presented with injury to his Left hand. Pt was reportedly intoxicated on unspecified substance, and placed a metal ring or device on his L middle and L ring fingers 2 days prior to ED presentation. Pt required procedural sedation w ketamine, ativan, and pain relief w morphine to facilitate removal of the device w lilli by ERENDIRA. Utox positive for amphetamines and opioids. X-ray  showed no fracture and just mild swelling. Pt received TDAP and zosyn for 2 doses. Pt being discharged on Clindamycin 300mg TID for 5 days.     Given patient's improved clinical status and current hemodynamic stability, decision was made to discharge. Discussed with attending. Please refer to patient's complete medical chart with documents for a full hospital course, for this is only a brief summary.

## 2023-08-29 NOTE — DISCHARGE NOTE PROVIDER - NSFOLLOWUPCLINICS_GEN_ALL_ED_FT
[facility]  HIV/AIDS Research & Treatment  [address], [address]  [place] [address]  Phone: [phone]  Fax: [phone]  Follow Up Time: 2 weeks     [facility]  HIV/AIDS Research & Treatment  [address], [address]  [place] [address]  Phone: [phone]  Fax: [phone]  Follow Up Time: 2 weeks    [facility]  Internal Medicine  [address] [place] [address]  Phone: [phone]  Fax: [phone]

## 2023-08-29 NOTE — DISCHARGE NOTE PROVIDER - NSDCQMPCI_CARD_ALL_CORE
Detail Level: Zone Medical Necessity Clause: This procedure was medically necessary because the lesions that were treated were: Post-Care Instructions: I reviewed with the patient in detail post-care instructions. Patient is to wear sunprotection, and avoid picking at any of the treated lesions. Pt may apply Vaseline to crusted or scabbing areas. Medical Necessity Information: It is in your best interest to select a reason for this procedure from the list below. All of these items fulfill various CMS LCD requirements except the new and changing color options. No Total Number Of Lesions Treated: 2 Add 52 Modifier (Optional): no Include Z78.9 (Other Specified Conditions Influencing Health Status) As An Associated Diagnosis?: Yes Duration Of Freeze Thaw-Cycle (Seconds): 10 Number Of Freeze-Thaw Cycles: 1 freeze-thaw cycle Consent: The patient's consent was obtained including but not limited to risks of crusting, scabbing, blistering, scarring, darker or lighter pigmentary change, recurrence, incomplete removal and infection.

## 2023-08-29 NOTE — DISCHARGE NOTE NURSING/CASE MANAGEMENT/SOCIAL WORK - PATIENT PORTAL LINK FT
You can access the FollowMyHealth Patient Portal offered by [facility] by registering at the following website: [email or web address]. By joining [facility]’s FollowMyHealth portal, you will also be able to view your health information using other applications (apps) compatible with our system.

## 2023-08-29 NOTE — DISCHARGE NOTE PROVIDER - ATTENDING DISCHARGE PHYSICAL EXAMINATION:
CONSTITUTIONAL: casually groomed, no apparent distress,  noncooperative with answering questions and repeatedly requesting to be discharged home  HEENT: normotramuatic, acephalic. Oral mucosa with moist membranes. EOMI, nonicteric sclera. No external nasal lesions; nasal mucosa not inflamed; no pharyngeal injection or exudates.  RESPIRATORY: No respiratory distress, no use of accessory muscles; CTA b/l, no wheezes, rales or rhonchi  CARDIOVASCULAR: RRRR, +S1S2, no murmurs, no rubs, no gallops; no JVD; no peripheral edema  Vascular: carotid pulse palpable, radial pulse palpable  GASTROINTESTINAL: +BS, Soft, non tender, non distended, no rebound, no guarding; No palpable masses  MUSCULOSKELETAL: No digital clubbing or cyanosis; examination of the (head/neck, spine/ribs/pelvis, RUE, LUE, RLE, LLE) without misalignment, no spinal tenderness  HAND: Circumferential ulcer on proximal phalanx of L middle finger approx 2 cm, with black base, and 1 cm on ring fingers with white base, hand swollen but improvement of erythema and minimal tenderness. Pt noted to be moving all fingers of the L hand, hand is warm to touch  SKIN: no rashes, no track marks on extremities  NEUROLOGIC: follows commands, strength intact in UE and LE

## 2023-08-29 NOTE — DISCHARGE NOTE PROVIDER - NSDCMRMEDTOKEN_GEN_ALL_CORE_FT
Biktarvy 50 mg-200 mg-25 mg oral tablet: 1 tab(s) orally once a day  clindamycin 300 mg oral capsule: 1 cap(s) orally 3 times a day  Vitamin D3:

## 2023-08-29 NOTE — DISCHARGE NOTE NURSING/CASE MANAGEMENT/SOCIAL WORK - NSDCVIVACCINE_GEN_ALL_CORE_FT
Tdap; 27-Aug-2023 23:21; Day Barnhart (BARBY); [employer]; [id number] (Exp. Date: 23-Aug-2025); IntraMuscular; Dorsogluteal Left.; 0.5 milliLiter(s); VIS (VIS Published: 09-May-2013, VIS Presented: 27-Aug-2023);

## 2023-08-29 NOTE — ADVANCED PRACTICE NURSE CONSULT - ASSESSMENT
This is a 27yr old male patient admitted for Cellulitis, presenting with the following:  -There is a L. Hand 3rd Finger Laceration (0.2cm x 1cm x 0.2cm) with pale tissue, pink tissue, purulent drainage, and surrounding tissue Cellulitis  -It is to be noted that the patient is being followed by Internal Medicine for Antibiotic treatment

## 2023-08-29 NOTE — SBIRT NOTE ADULT - NSSBIRTALCPOSREINDET_GEN_A_CORE
[place] explored substance use resources and educated pt on the harms of substance use has on one's overall health.

## 2023-08-29 NOTE — ADVANCED PRACTICE NURSE CONSULT - RECOMMEDATIONS
-Clean the L. Hand 3rd Finger wound with normal saline and pat dry  -Apply Bacitracin ointment to the wound bed and cover with a non-adherent dry dressing Daily PRN

## 2023-08-30 LAB
GRAM STN FLD: SIGNIFICANT CHANGE UP
HIV-1 VIRAL LOAD RESULT: SIGNIFICANT CHANGE UP
HIV1 RNA # SERPL NAA+PROBE: SIGNIFICANT CHANGE UP COPIES/ML
HIV1 RNA SER-IMP: SIGNIFICANT CHANGE UP
HIV1 RNA SERPL NAA+PROBE-ACNC: SIGNIFICANT CHANGE UP
HIV1 RNA SERPL NAA+PROBE-LOG#: SIGNIFICANT CHANGE UP LG COP/ML
METHOD TYPE: SIGNIFICANT CHANGE UP
MSSA DNA SPEC QL NAA+PROBE: SIGNIFICANT CHANGE UP

## 2023-08-30 NOTE — CHART NOTE - NSCHARTNOTEFT_GEN_A_CORE
Blood culture in 1/4 bottles showing MSSA. Called patient's phone [phone] to inform patient to come back to hospital for further IV abx treatment and further workup. Patient initially picked up phone but did not answer, I tried introducing myself and informed regarding results but there was no answer back. Tried calling multiple times after but patient did not  the phone and voicemail box was full. Called sister, Conor Felix, and reported results of MSSA bacteria in the blood cultures. Informed that patient should report back to this hospital to receive further antibiotics and care. If patient refuses to come back to this hospital, patient should at least seek medical care elsewhere to treat bacteria. Sister understood that patient has infection and should come back to hospital and reports that she will also try to inform patient about results. Sister confirms that the phone number on file is the correct phone number.

## 2023-08-31 ENCOUNTER — INPATIENT (INPATIENT)
Facility: HOSPITAL | Age: 28
LOS: 9 days | Discharge: LEFT BEFORE DISCHARGE | DRG: 872 | End: 2023-09-10
Attending: STUDENT IN AN ORGANIZED HEALTH CARE EDUCATION/TRAINING PROGRAM | Admitting: STUDENT IN AN ORGANIZED HEALTH CARE EDUCATION/TRAINING PROGRAM
Payer: MEDICAID

## 2023-08-31 VITALS
HEART RATE: 100 BPM | HEIGHT: 63 IN | TEMPERATURE: 99 F | SYSTOLIC BLOOD PRESSURE: 135 MMHG | RESPIRATION RATE: 18 BRPM | WEIGHT: 156.09 LBS | OXYGEN SATURATION: 100 % | DIASTOLIC BLOOD PRESSURE: 81 MMHG

## 2023-08-31 DIAGNOSIS — F19.90 OTHER PSYCHOACTIVE SUBSTANCE USE, UNSPECIFIED, UNCOMPLICATED: ICD-10-CM

## 2023-08-31 DIAGNOSIS — B20 HUMAN IMMUNODEFICIENCY VIRUS [HIV] DISEASE: ICD-10-CM

## 2023-08-31 DIAGNOSIS — T14.8XXA OTHER INJURY OF UNSPECIFIED BODY REGION, INITIAL ENCOUNTER: ICD-10-CM

## 2023-08-31 DIAGNOSIS — F32.A DEPRESSION, UNSPECIFIED: ICD-10-CM

## 2023-08-31 DIAGNOSIS — Z65.9 PROBLEM RELATED TO UNSPECIFIED PSYCHOSOCIAL CIRCUMSTANCES: ICD-10-CM

## 2023-08-31 DIAGNOSIS — Z29.9 ENCOUNTER FOR PROPHYLACTIC MEASURES, UNSPECIFIED: ICD-10-CM

## 2023-08-31 DIAGNOSIS — R78.81 BACTEREMIA: ICD-10-CM

## 2023-08-31 LAB
ALBUMIN SERPL ELPH-MCNC: 3.3 G/DL — LOW (ref 3.5–5)
ALP SERPL-CCNC: 65 U/L — SIGNIFICANT CHANGE UP (ref 40–120)
ALT FLD-CCNC: 33 U/L DA — SIGNIFICANT CHANGE UP (ref 10–60)
ANION GAP SERPL CALC-SCNC: 7 MMOL/L — SIGNIFICANT CHANGE UP (ref 5–17)
AST SERPL-CCNC: 34 U/L — SIGNIFICANT CHANGE UP (ref 10–40)
BASOPHILS # BLD AUTO: 0.04 K/UL — SIGNIFICANT CHANGE UP (ref 0–0.2)
BASOPHILS NFR BLD AUTO: 0.5 % — SIGNIFICANT CHANGE UP (ref 0–2)
BILIRUB SERPL-MCNC: 0.2 MG/DL — SIGNIFICANT CHANGE UP (ref 0.2–1.2)
BUN SERPL-MCNC: 8 MG/DL — SIGNIFICANT CHANGE UP (ref 7–18)
CALCIUM SERPL-MCNC: 9 MG/DL — SIGNIFICANT CHANGE UP (ref 8.4–10.5)
CHLORIDE SERPL-SCNC: 107 MMOL/L — SIGNIFICANT CHANGE UP (ref 96–108)
CO2 SERPL-SCNC: 26 MMOL/L — SIGNIFICANT CHANGE UP (ref 22–31)
CREAT SERPL-MCNC: 0.86 MG/DL — SIGNIFICANT CHANGE UP (ref 0.5–1.3)
CRP SERPL-MCNC: 11 MG/L — HIGH
EGFR: 120 ML/MIN/1.73M2 — SIGNIFICANT CHANGE UP
EOSINOPHIL # BLD AUTO: 0.17 K/UL — SIGNIFICANT CHANGE UP (ref 0–0.5)
EOSINOPHIL NFR BLD AUTO: 2.3 % — SIGNIFICANT CHANGE UP (ref 0–6)
ERYTHROCYTE [SEDIMENTATION RATE] IN BLOOD: 16 MM/HR — HIGH (ref 0–15)
GLUCOSE SERPL-MCNC: 119 MG/DL — HIGH (ref 70–99)
HCT VFR BLD CALC: 39.8 % — SIGNIFICANT CHANGE UP (ref 39–50)
HGB BLD-MCNC: 13.3 G/DL — SIGNIFICANT CHANGE UP (ref 13–17)
IMM GRANULOCYTES NFR BLD AUTO: 0.3 % — SIGNIFICANT CHANGE UP (ref 0–0.9)
LACTATE SERPL-SCNC: 1.7 MMOL/L — SIGNIFICANT CHANGE UP (ref 0.7–2)
LYMPHOCYTES # BLD AUTO: 2.21 K/UL — SIGNIFICANT CHANGE UP (ref 1–3.3)
LYMPHOCYTES # BLD AUTO: 29.7 % — SIGNIFICANT CHANGE UP (ref 13–44)
MCHC RBC-ENTMCNC: 31.2 PG — SIGNIFICANT CHANGE UP (ref 27–34)
MCHC RBC-ENTMCNC: 33.4 GM/DL — SIGNIFICANT CHANGE UP (ref 32–36)
MCV RBC AUTO: 93.4 FL — SIGNIFICANT CHANGE UP (ref 80–100)
MONOCYTES # BLD AUTO: 0.56 K/UL — SIGNIFICANT CHANGE UP (ref 0–0.9)
MONOCYTES NFR BLD AUTO: 7.5 % — SIGNIFICANT CHANGE UP (ref 2–14)
NEUTROPHILS # BLD AUTO: 4.43 K/UL — SIGNIFICANT CHANGE UP (ref 1.8–7.4)
NEUTROPHILS NFR BLD AUTO: 59.7 % — SIGNIFICANT CHANGE UP (ref 43–77)
NRBC # BLD: 0 /100 WBCS — SIGNIFICANT CHANGE UP (ref 0–0)
PLATELET # BLD AUTO: 447 K/UL — HIGH (ref 150–400)
POTASSIUM SERPL-MCNC: 3.6 MMOL/L — SIGNIFICANT CHANGE UP (ref 3.5–5.3)
POTASSIUM SERPL-SCNC: 3.6 MMOL/L — SIGNIFICANT CHANGE UP (ref 3.5–5.3)
PROCALCITONIN SERPL-MCNC: 0.09 NG/ML — SIGNIFICANT CHANGE UP (ref 0.02–0.1)
PROT SERPL-MCNC: 7.6 G/DL — SIGNIFICANT CHANGE UP (ref 6–8.3)
RBC # BLD: 4.26 M/UL — SIGNIFICANT CHANGE UP (ref 4.2–5.8)
RBC # FLD: 12.8 % — SIGNIFICANT CHANGE UP (ref 10.3–14.5)
SODIUM SERPL-SCNC: 140 MMOL/L — SIGNIFICANT CHANGE UP (ref 135–145)
WBC # BLD: 7.43 K/UL — SIGNIFICANT CHANGE UP (ref 3.8–10.5)
WBC # FLD AUTO: 7.43 K/UL — SIGNIFICANT CHANGE UP (ref 3.8–10.5)

## 2023-08-31 PROCEDURE — 73130 X-RAY EXAM OF HAND: CPT | Mod: 26,LT

## 2023-08-31 PROCEDURE — 99285 EMERGENCY DEPT VISIT HI MDM: CPT

## 2023-08-31 PROCEDURE — 99222 1ST HOSP IP/OBS MODERATE 55: CPT | Mod: GC

## 2023-08-31 RX ORDER — CHOLECALCIFEROL (VITAMIN D3) 125 MCG
2000 CAPSULE ORAL DAILY
Refills: 0 | Status: DISCONTINUED | OUTPATIENT
Start: 2023-08-31 | End: 2023-09-10

## 2023-08-31 RX ORDER — CEFAZOLIN SODIUM 1 G
1000 VIAL (EA) INJECTION ONCE
Refills: 0 | Status: COMPLETED | OUTPATIENT
Start: 2023-08-31 | End: 2023-08-31

## 2023-08-31 RX ORDER — KETOROLAC TROMETHAMINE 30 MG/ML
15 SYRINGE (ML) INJECTION ONCE
Refills: 0 | Status: DISCONTINUED | OUTPATIENT
Start: 2023-08-31 | End: 2023-08-31

## 2023-08-31 RX ORDER — BICTEGRAVIR SODIUM, EMTRICITABINE, AND TENOFOVIR ALAFENAMIDE FUMARATE 30; 120; 15 MG/1; MG/1; MG/1
1 TABLET ORAL DAILY
Refills: 0 | Status: DISCONTINUED | OUTPATIENT
Start: 2023-08-31 | End: 2023-09-10

## 2023-08-31 RX ORDER — IBUPROFEN 200 MG
400 TABLET ORAL EVERY 8 HOURS
Refills: 0 | Status: DISCONTINUED | OUTPATIENT
Start: 2023-08-31 | End: 2023-09-10

## 2023-08-31 RX ORDER — ONDANSETRON 8 MG/1
4 TABLET, FILM COATED ORAL EVERY 8 HOURS
Refills: 0 | Status: DISCONTINUED | OUTPATIENT
Start: 2023-08-31 | End: 2023-09-10

## 2023-08-31 RX ORDER — CEFAZOLIN SODIUM 1 G
2000 VIAL (EA) INJECTION EVERY 8 HOURS
Refills: 0 | Status: DISCONTINUED | OUTPATIENT
Start: 2023-08-31 | End: 2023-09-10

## 2023-08-31 RX ADMIN — BICTEGRAVIR SODIUM, EMTRICITABINE, AND TENOFOVIR ALAFENAMIDE FUMARATE 1 TABLET(S): 30; 120; 15 TABLET ORAL at 21:36

## 2023-08-31 RX ADMIN — Medication 400 MILLIGRAM(S): at 21:36

## 2023-08-31 RX ADMIN — Medication 2000 UNIT(S): at 21:37

## 2023-08-31 RX ADMIN — Medication 100 MILLIGRAM(S): at 21:37

## 2023-08-31 RX ADMIN — Medication 100 MILLIGRAM(S): at 14:17

## 2023-08-31 RX ADMIN — Medication 15 MILLIGRAM(S): at 14:19

## 2023-08-31 RX ADMIN — Medication 15 MILLIGRAM(S): at 13:49

## 2023-08-31 RX ADMIN — Medication 100 MILLIGRAM(S): at 13:50

## 2023-08-31 RX ADMIN — Medication 400 MILLIGRAM(S): at 22:06

## 2023-08-31 RX ADMIN — Medication 1 TABLET(S): at 21:36

## 2023-08-31 NOTE — ED ADULT NURSE NOTE - NSFALLUNIVINTERV_ED_ALL_ED
Bed/Stretcher in lowest position, wheels locked, appropriate side rails in place/Call bell, personal items and telephone in reach/Instruct patient to call for assistance before getting out of bed/chair/stretcher/Non-slip footwear applied when patient is off stretcher/[place] to call system/Physically safe environment - no spills, clutter or unnecessary equipment/Purposeful proactive rounding/Room/bathroom lighting operational, light cord in reach

## 2023-08-31 NOTE — ED PROVIDER NOTE - CLINICAL SUMMARY MEDICAL DECISION MAKING FREE TEXT BOX
Patient is a 26 yo male with PMHx polysubstance use disorder, HIV + on Biktarvy, recent L hand cellulitis secondary to metal ring presenting with positive blood cultures. VSS      Will consult ID, check labs and cultures, start on IV abx, xrays, likely admit. Patient is a 28 yo male with PMHx polysubstance use disorder, HIV + on Biktarvy, recent L hand cellulitis secondary to metal ring presenting with positive blood cultures. VSS      Will consult ID, check labs and cultures, start on IV abx, xrays, likely admit.  ATTG: : Assessment/plan finger infection currently antibiotics with positive blood cultures and immune compromised we will check labs start IV antibiotics admit to hospital for ID work-up and further care.

## 2023-08-31 NOTE — H&P ADULT - NSHPPHYSICALEXAM_GEN_ALL_CORE
T(C): 36.8 (08-31-23 @ 15:51), Max: 37 (08-31-23 @ 11:59)  HR: 78 (08-31-23 @ 15:51) (78 - 100)  BP: 131/82 (08-31-23 @ 15:51) (131/82 - 135/81)  RR: 18 (08-31-23 @ 15:51) (18 - 18)  SpO2: 98% (08-31-23 @ 15:51) (98% - 100%)    CONSTITUTIONAL: Well groomed, no apparent distress    HEENT: normotramuatic, acephalic, PERRL and symmetric, EOMI, No conjunctival or scleral injection, non-icteric. Oral mucosa with moist membranes. No external nasal lesions; nasal mucosa not inflamed; no pharyngeal injection or exudates.               Neck: supple, symmetric and without tracheal deviation    RESPIRATORY: No respiratory distress, no use of accessory muscles; CTA b/l, no wheezes, rales or rhonchi    CARDIOVASCULAR: RRRR, +S1S2, no murmurs, no rubs, no gallops; no JVD; no peripheral edema  	Vascular: carotid pulse palpable, radial pulse palpable    GASTROINTESTINAL: +BS, Soft, non tender, non distended, no rebound, no guarding; No palpable masses    MUSCULOSKELETAL: No digital clubbing or cyanosis; examination of the (head/neck, spine/ribs/pelvis, RUE, LUE, RLE, LLE) without misalignment, no spinal tenderness. L middle finger swollen, warm to touch with 2 cm wound, with granulation tissue and mildly tender to palp    SKIN: No rashes or ulcers noted; no subcutaneous nodules or induration palpable    NEUROLOGIC: sensation intact in upper and lower extremities b/l to light touch; strength appropriate    PSYCHIATRIC: Appropriate insight/judgment; A+O x 3, mood and affect appropriate, recent/remote memory intact    LYMPHATIC: No cervical LAD or tenderness    GENITOURINARY: No suprapubic tenderness, no CVA tenderness T(C): 36.8 (08-31-23 @ 15:51), Max: 37 (08-31-23 @ 11:59)  HR: 78 (08-31-23 @ 15:51) (78 - 100)  BP: 131/82 (08-31-23 @ 15:51) (131/82 - 135/81)  RR: 18 (08-31-23 @ 15:51) (18 - 18)  SpO2: 98% (08-31-23 @ 15:51) (98% - 100%)    CONSTITUTIONAL: Well groomed, no apparent distress, pleasant    HEENT: normotramuatic, acephalic, PERRL and symmetric, EOMI, No conjunctival or scleral injection, non-icteric. Oral mucosa with moist membranes. No external nasal lesions; nasal mucosa not inflamed; no pharyngeal injection or exudates.               Neck: supple, symmetric and without tracheal deviation    RESPIRATORY: No respiratory distress, no use of accessory muscles; CTA b/l, no wheezes, rales or rhonchi    CARDIOVASCULAR: RRRR, +S1S2, no murmurs, no rubs, no gallops; no JVD; no peripheral edema  	Vascular: carotid pulse palpable, radial pulse palpable    GASTROINTESTINAL: +BS, Soft, non tender, non distended, no rebound, no guarding; No palpable masses    MUSCULOSKELETAL: No digital clubbing or cyanosis; examination of the (head/neck, spine/ribs/pelvis, RUE, LUE, RLE, LLE) without misalignment, no spinal tenderness. L middle finger swollen, warm to touch with 2 cm wound, with granulation tissue and mildly tender to palp    SKIN: No rashes or ulcers noted; no subcutaneous nodules or induration palpable    NEUROLOGIC: sensation intact in upper and lower extremities b/l to light touch; strength appropriate    PSYCHIATRIC: Appropriate insight/judgment; A+O x 3, mood and affect appropriate, recent/remote memory intact    LYMPHATIC: No cervical LAD or tenderness    GENITOURINARY: No suprapubic tenderness, no CVA tenderness

## 2023-08-31 NOTE — H&P ADULT - ASSESSMENT
28 yo M w crystal meth IV drug use, (w hx of rhabdo in 2016 from crystal meth use), HIV+ on Biktarvy (viral load undetectable 8/2023), with hx of suicide attempt and unspecified hx of psychosis/hallucinations, admitted for MSSA bacteremia
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
## 2023-08-31 NOTE — H&P ADULT - PROBLEM SELECTOR PLAN 2
pt was intoxicated and had caused prolonged mechanical injury to hand from placing ringlike device for 2-3 days on hand, was removed in the ED on 8/28, XR was neg for fx  Pt left AMA    -wound care recs as per previous admission: -Clean the L. Hand 3rd Finger wound with normal saline and pat dry. Apply Bacitracin ointment to the wound bed and cover with a non-adherent dry dressing Daily PRN  -holding off on repeat wound care consult as pt was just evaluated a few days ago  -advil PRN for pain, pt is allergic to tylenol

## 2023-08-31 NOTE — PATIENT PROFILE ADULT - HAVE YOU EXPERIENCED VIOLENCE OR A TRAUMATIC EVENT?
Take Keflex twice a day for the next 5 days.      Look out for signs of infections as discussed extensively in the ER.  This includes fevers   Pus worsening pain.      Crutches were provided to keep pressure off the foot so that it heals quicker.    Please take 600 mg of Ibuprofen (aka Motrin, Advil) and/or 650 mg Acetaminophen (aka Tylenol) every 6 hours, as needed, for mild-moderate pain.
no

## 2023-08-31 NOTE — ED ADULT NURSE NOTE - OBJECTIVE STATEMENT
Patient called by PMD for abnormal, patient not aware of which lab. Pain to left third finger pain, noted swelling and redness. States metal beer opener was removed from his finger two days ago.

## 2023-08-31 NOTE — H&P ADULT - PROBLEM SELECTOR PLAN 5
Reports hx of depression and usicide attempt in 2016  Denies any current suicidal and homicidal ideation emphatically

## 2023-08-31 NOTE — ED ADULT TRIAGE NOTE - GLASGOW COMA SCALE: BEST MOTOR RESPONSE, MLM
"I vomited" c/o vomiting x2 times and abd pain starting 1x hr ago. Father reports small amount blood tinged vomit prompting trip to ED. Denies fevers denies diarrhea. Able to ambulate with steady gait noted, Appears in no apparent distress @ this time (M6) obeys commands

## 2023-08-31 NOTE — H&P ADULT - HISTORY OF PRESENT ILLNESS
Pt is a 26 yo M w polysubstance use disorder (including crystal meth w hx of rhabdo in 2016 from meth use), HIV+ on Biktarvy, with hx of suicide attempt and unspecified hx of psychosis/hallucinations, presented with injury to his Left hand. Pt was reportedly intoxicated on unspecified substance, and placed a metal ring? or device on his L middle and L ring fingers 2 days prior to ED presentation. As per chart review, pt does not know why he placed the object on his hand, and was endorsing pain. Pt required procedural sedation w ketamine, ativan, and pain relief w morphine to facilitate removal of the device w lilli by ERENDIRA.   Pt is a 26 yo M w polysubstance use disorder (including crystal meth w hx of rhabdo in 2016 from meth use), HIV+ on Biktarvy, with hx of suicide attempt and unspecified hx of psychosis/hallucinations, presented with injury to his Left hand. Pt was admitted to [facility] on 8/28 and left AMA on 8/29, after intoxicated meth and placed a metal ring or device on his L middle and L ring fingers 2 days prior to ED presentation. Pt required procedural sedation w ketamine, ativan, and pain relief w morphine to facilitate removal of the device w lilli by ERENDIRA. He was given IV abx and then dc with Clinda when he left AMA. Blood cultures came back pos for MSSA, so pt was requested to come back to ED.     He is currently endorsing pain in the L middle finger on finger extension, but is otherwise denying all other ssx, including fevers, chills,    Pt is a 28 yo M w crystal meth IV drug use, (w hx of rhabdo in 2016 from crystal meth use), HIV+ on Biktarvy, with hx of suicide attempt and unspecified hx of psychosis/hallucinations, presented with injury to his Left hand. Pt was admitted to [facility] on 8/28 and left AMA on 8/29, after intoxicated meth and placed a metal ring or device on his L middle and L ring fingers 2 days prior to ED presentation. Pt required procedural sedation w ketamine, ativan, and pain relief w morphine to facilitate removal of the device w lilli by ERENDIRA. He was given IV abx and then dc with Clinda when he left AMA. Blood cultures came back pos for MSSA, so pt was requested to come back to ED. He endorses that he last used crystal meth on the day before admission.    He is currently endorsing pain in the L middle finger on finger extension, but is otherwise denying all other ssx, including fevers, chills,    Pt is a 26 yo M w crystal meth IV drug use, (w hx of rhabdo in 2016 from crystal meth use), HIV+ on Biktarvy (viral load undetectable 8/2023), with hx of suicide attempt and unspecified hx of psychosis/hallucinations, presented with injury to his Left hand. Pt was admitted to [facility] on 8/28 and left AMA on 8/29, after intoxicated meth and placed a metal ring or device on his L middle and L ring fingers 2 days prior to ED presentation. Pt required procedural sedation w ketamine, ativan, and pain relief w morphine to facilitate removal of the device w lilli by ERENDIRA. He was given IV abx and then dc with Clinda when he left AMA. Blood cultures came back pos for MSSA, so pt was requested to come back to ED. He endorses that he last used crystal meth on the day before admission.    He is currently endorsing pain in the L middle finger on finger extension, but is otherwise denying all other ssx, including fevers, chills,    Pt is a 26 yo M w crystal meth IV drug use, (w hx of rhabdo in 2016 from crystal meth use), HIV+ on Biktarvy (viral load undetectable 8/2023), with hx of suicide attempt and unspecified hx of psychosis/hallucinations, presented with injury to his Left hand. Pt was admitted to [facility] on 8/28 and left AMA on 8/29, after intoxicated meth and placed a metal ring or device on his L middle and L ring fingers 2 days prior to ED presentation. Pt required procedural sedation w ketamine, ativan, and pain relief w morphine to facilitate removal of the device w lilli by ERENDIRA. He was given IV abx and then dc with Clindamycin PO when he left AMA. Blood cultures came back pos for MSSA, so pt was requested to come back to ED. He endorses that he last used crystal meth on the day before admission.    He is currently endorsing pain in the L middle finger on finger extension, but is otherwise denying all other ssx, including fevers, chills,

## 2023-08-31 NOTE — H&P ADULT - PROBLEM SELECTOR PLAN 3
on Biktarvy, follows with Jonel Hewitt NP  viral load 8/29/2023 undetectable  CD counts within acceptable limits    -cont biktarvy  -ID Dr. Tavares on Biktarvy, diagnosed with HIV in 2015 and had been getting HIV care/medications since 2017 from [facility] ([place] location) with NP Jonel Hewitt  viral load 8/29/2023 undetectable  CD4 counts 367 on 8/29/2023    -cont biktarvy  -ID Dr. Tavares

## 2023-08-31 NOTE — ED PROVIDER NOTE - PHYSICAL EXAMINATION
Constitutional: Well appearing, awake, alert, oriented to person, place, and time/situation and in no apparent distress  ENMT: Airway patent nasal mucosa clear. Mouth with normal mucosa. Throat has no vesicles no oropharyngeal exudates and uvula is midline. No blood in the oropharynx  EYES: clear bilaterally, pupils equal, round and reactive to light  Cardiac: Regular rate, regular rhythm. Heart sounds S1, S2. No murmurs, rubs or gallops. Good capillary refill, 2+ pulses, no peripheral edema  Respiratory: Lungs CTAB, no use of accessory muscles, no crackles, satting 99% on RA in no distress  Gastrointestinal: Abdomen nondistended, non-tender, no rebound guarding or peritoneal signs  Genitourinary: No CVA tenderness, pelvis nontender, bladder nondistended  Musculoskeletal: Spine appears normal, range of motion is not limited, no muscle or joint tenderness  Neurological: Alert and oriented, no focal deficits, no motor or sensory deficits. CN 2-12 intact, PERRLA, EOMI, No FND  Skin: L hand with erythema along dorsum of 3rd finger between MCP and Constitutional: Well appearing, awake, alert, oriented to person, place, and time/situation and in no apparent distress  ENMT: Airway patent nasal mucosa clear. Mouth with normal mucosa. Throat has no vesicles no oropharyngeal exudates and uvula is midline. No blood in the oropharynx  EYES: clear bilaterally, pupils equal, round and reactive to light  Cardiac: Regular rate, regular rhythm. Heart sounds S1, S2. No murmurs, rubs or gallops. Good capillary refill, 2+ pulses, no peripheral edema  Respiratory: Lungs CTAB, no use of accessory muscles, no crackles, satting 99% on RA in no distress  Gastrointestinal: Abdomen nondistended, non-tender, no rebound guarding or peritoneal signs  Genitourinary: No CVA tenderness, pelvis nontender, bladder nondistended  Musculoskeletal: Spine appears normal, range of motion is not limited, no muscle or joint tenderness  Neurological: Alert and oriented, no focal deficits, no motor or sensory deficits. CN 2-12 intact, PERRLA, EOMI, No FND  Skin: L hand with erythema along dorsum of 3rd finger between MCP and PIP with small laceration, no purulence no active drainage, non tender, full ROM.

## 2023-08-31 NOTE — ED PROVIDER NOTE - ATTENDING CONTRIBUTION TO CARE
47-year-old male with PMHx of polysubstance abuse, (crystal meth and rhabdo) HIV positive, unknown viral load and CD4 count, psychiatric history, presents as a return to the emergency department for positive blood cultures.  Patient was seen here for ring that cannot be removed from his hand which was ultimately removed here in the emergency department.  Patient experienced as a result lacerations to his left middle and ring finger.  Today was called back that there is a Staphylococcus in his blood cultures in 1 bottle.  Patient states he has no fever or chills.  He has continued pain.  There are some improvement in the range of motion.  He has been taking his clindamycin medication as prescribed  Gen.  no acute distres  HEENT:  perrl eom   Lungs:  b/l bs  CVS: S1S2   Abd;  soft no tender no distention  Ext: left hand erythema and edema of middle and ringer finger (greater on middle finger), with full range of motion. no tenderness over tendons. distal neurovasc intact. no drainage over healing laceration of middle finger.   Neuro: aaox3, no focal deficits  MSK: strength 5/5 b/l upper and lower ext.

## 2023-08-31 NOTE — ED PROVIDER NOTE - OBJECTIVE STATEMENT
Patient is a 26 yo male with PMHx polysubstance use disorder, HIV + on Biktarvy, recent L hand cellulitis secondary to metal ring presenting with positive blood cultures. Patient sent in as blood cultures from previous visit grew MSSA. Culture susceptibilities not back at this time. Patient states pain is approximately the same. Denies any other symptoms currently. Denies fevers, chills, dizziness, lightheadedness, dysphagia, dysarthria, diplopia, photophobia, syncope, cough, congestion, SOB, CP, abdominal pain, neck pain, back pain, diarrhea, dysuria, hematuria, hematochezia, hematemesis, n/v, recent travel, sick contacts, leg swelling.

## 2023-08-31 NOTE — H&P ADULT - ATTENDING COMMENTS
26 yo M with pmh of HIV (on ART), polysubstance use disorder, depression (with hx of suicide attempt in 2017), recently admitted for L hand cellulitis who presents for treatment of MSSA bacteremia. Initially admitted to [facility] 8/28/23 for intoxication and a carabiner stuck on his L middle and L ring finger two days prior to admission, which was eventually removed by ERENDIRA during his hospital stay. He was started on IV zosyn, then changed to IV vancomycin. Pt was insistent on leaving and was discharged AMA 8/29 on PO clindamycin, which patient reports taking since discharge. Blood cultures which were obtained in the prior admission, resulted with MSSA in one bottle, pending sensitivities. Patient was notified yesterday 8/30, told to return to ED. On admission, patient is afebrile, feels otherwise well but reports pain on the L hand mainly on the 4th finger with warmth, erythema and purulence.  Labs done in recent admission with undetectable VL and CD4 of 367. Pt states he was diagnosed with HIV in 2015 and had been getting HIV care/medications since 2017 from [facility] [place] states compliance with biktarvy. Today he denies fever, chills, nausea, vomiting, diarrhea, dysuria. States his L hand pain is 7/10 but denies numbness or tingling. XR done in ED with soft tissue swelling, bones unremarkable.    SH - patient currently unemployed, lives by himself in [place]. Has male partner who lives in [place], but did not provide further information. States pt also has recent court ordered community service for recent arrest, has a  for his case.   Denies etoh, tobacco use. Admits to IV crystal meth use, most recently as of yesterday.      Vitals, labs and imaging independently reviewed. No significant leukocytosis, negative lactate, XR with soft tissue swelling, no evidence of cortical destruction.     Physical Exam  Gen: NAD, nontoxic appearing  HEENT: NC/AT, MMM, EOMi, sclera nonicteric  Cardiac: +S1/S2, RRR  Lungs: CTA b/l  Abd: Soft, NTND, +BS  Extremities: warm. L hand with warmth and erythema of L ring finger, with a 1cm indurated cut with mild purulence. R hand wnl.   Neuro: AOx3    A/P    #MSSA bacteremia  #L hand cellulitis  #HIV on ART  #Active IDVU  #Depression  #Mild thrombocytosis    -c/w IV cefazolin  -f/u ID consult  -f/u repeat Bcx  -TTE  -trend CBC, esr, crp, fever curve, obtain procal  -wound care consult  -if wound worsening, can obtain CT hand to r/o abscess  -c/w Biktarvy  -contact clinic/pharmacy for collateral   -utox  -social work consult

## 2023-08-31 NOTE — H&P ADULT - NSICDXPASTMEDICALHX_GEN_ALL_CORE_FT
PAST MEDICAL HISTORY:  HIV infection     Major depression     Polysubstance use disorder     Suicide attempt
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
## 2023-08-31 NOTE — H&P ADULT - PROBLEM SELECTOR PLAN 1
pt was intoxicated and had caused prolonged mechanical injury to hand from placing ringlike device for 2-3 days on hand, was removed in the ED on 8/28, XR was neg for fx. Pt left AMA on 8/29 and was d/c w clindamycin, which he has been taking.  s/p 2g ancef in ED    -ID Dr. Tavares  -TTE given IV drug use and HIV  -cont ancef 2 g q8h  -f/u procal, esr, crp  -trend esr and crp q48h pt was intoxicated and had caused prolonged mechanical injury to hand from placing ringlike device for 2-3 days on hand, was removed in the ED on 8/28, XR was neg for fx. Pt left AMA on 8/29 and was d/c w clindamycin, which he has been taking.  s/p 2g ancef in ED    -ID Dr. Tavares  -TTE given IV drug use and HIV  -cont ancef 2 g q8h  -f/u procal, esr, crp  -trend esr and crp q48h  -f/u repeat cultures

## 2023-08-31 NOTE — H&P ADULT - PROBLEM SELECTOR PLAN 4
Drug of choice is IV crystal methamphetamine  has been arrested and is pending court mandated rehab in 1 week    -SBIRT consulted, appreciated

## 2023-09-01 DIAGNOSIS — Z02.9 ENCOUNTER FOR ADMINISTRATIVE EXAMINATIONS, UNSPECIFIED: ICD-10-CM

## 2023-09-01 LAB
-  AMPICILLIN/SULBACTAM: SIGNIFICANT CHANGE UP
-  CEFAZOLIN: SIGNIFICANT CHANGE UP
-  CLINDAMYCIN: SIGNIFICANT CHANGE UP
-  ERYTHROMYCIN: SIGNIFICANT CHANGE UP
-  GENTAMICIN: SIGNIFICANT CHANGE UP
-  OXACILLIN: SIGNIFICANT CHANGE UP
-  PENICILLIN: SIGNIFICANT CHANGE UP
-  RIFAMPIN: SIGNIFICANT CHANGE UP
-  TETRACYCLINE: SIGNIFICANT CHANGE UP
-  TRIMETHOPRIM/SULFAMETHOXAZOLE: SIGNIFICANT CHANGE UP
-  VANCOMYCIN: SIGNIFICANT CHANGE UP
ANION GAP SERPL CALC-SCNC: 7 MMOL/L — SIGNIFICANT CHANGE UP (ref 5–17)
BASOPHILS # BLD AUTO: 0.04 K/UL — SIGNIFICANT CHANGE UP (ref 0–0.2)
BASOPHILS NFR BLD AUTO: 0.7 % — SIGNIFICANT CHANGE UP (ref 0–2)
BUN SERPL-MCNC: 8 MG/DL — SIGNIFICANT CHANGE UP (ref 7–18)
CALCIUM SERPL-MCNC: 8.9 MG/DL — SIGNIFICANT CHANGE UP (ref 8.4–10.5)
CHLORIDE SERPL-SCNC: 110 MMOL/L — HIGH (ref 96–108)
CO2 SERPL-SCNC: 25 MMOL/L — SIGNIFICANT CHANGE UP (ref 22–31)
CREAT SERPL-MCNC: 0.95 MG/DL — SIGNIFICANT CHANGE UP (ref 0.5–1.3)
CULTURE RESULTS: SIGNIFICANT CHANGE UP
EGFR: 111 ML/MIN/1.73M2 — SIGNIFICANT CHANGE UP
EOSINOPHIL # BLD AUTO: 0.19 K/UL — SIGNIFICANT CHANGE UP (ref 0–0.5)
EOSINOPHIL NFR BLD AUTO: 3.4 % — SIGNIFICANT CHANGE UP (ref 0–6)
GLUCOSE SERPL-MCNC: 92 MG/DL — SIGNIFICANT CHANGE UP (ref 70–99)
HCT VFR BLD CALC: 41 % — SIGNIFICANT CHANGE UP (ref 39–50)
HGB BLD-MCNC: 13.2 G/DL — SIGNIFICANT CHANGE UP (ref 13–17)
IMM GRANULOCYTES NFR BLD AUTO: 0.4 % — SIGNIFICANT CHANGE UP (ref 0–0.9)
LYMPHOCYTES # BLD AUTO: 2.02 K/UL — SIGNIFICANT CHANGE UP (ref 1–3.3)
LYMPHOCYTES # BLD AUTO: 35.9 % — SIGNIFICANT CHANGE UP (ref 13–44)
MAGNESIUM SERPL-MCNC: 2.1 MG/DL — SIGNIFICANT CHANGE UP (ref 1.6–2.6)
MCHC RBC-ENTMCNC: 30.4 PG — SIGNIFICANT CHANGE UP (ref 27–34)
MCHC RBC-ENTMCNC: 32.2 GM/DL — SIGNIFICANT CHANGE UP (ref 32–36)
MCV RBC AUTO: 94.5 FL — SIGNIFICANT CHANGE UP (ref 80–100)
METHOD TYPE: SIGNIFICANT CHANGE UP
MONOCYTES # BLD AUTO: 0.54 K/UL — SIGNIFICANT CHANGE UP (ref 0–0.9)
MONOCYTES NFR BLD AUTO: 9.6 % — SIGNIFICANT CHANGE UP (ref 2–14)
NEUTROPHILS # BLD AUTO: 2.82 K/UL — SIGNIFICANT CHANGE UP (ref 1.8–7.4)
NEUTROPHILS NFR BLD AUTO: 50 % — SIGNIFICANT CHANGE UP (ref 43–77)
NRBC # BLD: 0 /100 WBCS — SIGNIFICANT CHANGE UP (ref 0–0)
ORGANISM # SPEC MICROSCOPIC CNT: SIGNIFICANT CHANGE UP
PHOSPHATE SERPL-MCNC: 3 MG/DL — SIGNIFICANT CHANGE UP (ref 2.5–4.5)
PLATELET # BLD AUTO: 406 K/UL — HIGH (ref 150–400)
POTASSIUM SERPL-MCNC: 4 MMOL/L — SIGNIFICANT CHANGE UP (ref 3.5–5.3)
POTASSIUM SERPL-SCNC: 4 MMOL/L — SIGNIFICANT CHANGE UP (ref 3.5–5.3)
RBC # BLD: 4.34 M/UL — SIGNIFICANT CHANGE UP (ref 4.2–5.8)
RBC # FLD: 13 % — SIGNIFICANT CHANGE UP (ref 10.3–14.5)
SODIUM SERPL-SCNC: 142 MMOL/L — SIGNIFICANT CHANGE UP (ref 135–145)
SPECIMEN SOURCE: SIGNIFICANT CHANGE UP
WBC # BLD: 5.63 K/UL — SIGNIFICANT CHANGE UP (ref 3.8–10.5)
WBC # FLD AUTO: 5.63 K/UL — SIGNIFICANT CHANGE UP (ref 3.8–10.5)

## 2023-09-01 PROCEDURE — 99233 SBSQ HOSP IP/OBS HIGH 50: CPT

## 2023-09-01 PROCEDURE — 99222 1ST HOSP IP/OBS MODERATE 55: CPT

## 2023-09-01 RX ORDER — BACITRACIN ZINC 500 UNIT/G
1 OINTMENT IN PACKET (EA) TOPICAL DAILY
Refills: 0 | Status: DISCONTINUED | OUTPATIENT
Start: 2023-09-01 | End: 2023-09-10

## 2023-09-01 RX ADMIN — Medication 100 MILLIGRAM(S): at 21:42

## 2023-09-01 RX ADMIN — Medication 100 MILLIGRAM(S): at 05:25

## 2023-09-01 RX ADMIN — Medication 100 MILLIGRAM(S): at 14:55

## 2023-09-01 RX ADMIN — Medication 400 MILLIGRAM(S): at 11:05

## 2023-09-01 RX ADMIN — Medication 400 MILLIGRAM(S): at 10:41

## 2023-09-01 RX ADMIN — Medication 2000 UNIT(S): at 12:27

## 2023-09-01 RX ADMIN — BICTEGRAVIR SODIUM, EMTRICITABINE, AND TENOFOVIR ALAFENAMIDE FUMARATE 1 TABLET(S): 30; 120; 15 TABLET ORAL at 14:05

## 2023-09-01 RX ADMIN — Medication 1 APPLICATION(S): at 12:27

## 2023-09-01 NOTE — DISCHARGE NOTE PROVIDER - ATTENDING DISCHARGE PHYSICAL EXAMINATION:
PHYSICAL EXAM:  GENERAL: NAD, well-developed  HEAD:  Atraumatic, Normocephalic  EYES: conjunctiva and sclera clear  NECK: Supple, No JVD  CHEST/LUNG: Clear to auscultation bilaterally; No wheeze  HEART: Regular rate and rhythm; No murmurs, rubs, or gallops  ABDOMEN: Soft, Nontender, Nondistended; Bowel sounds present  EXTREMITIES:  No clubbing, cyanosis, or edema. Left finger wound without discharge, c/d/i.  PSYCH: AAOx3  NEUROLOGY: non-focal  SKIN: No rashes or lesions

## 2023-09-01 NOTE — PROGRESS NOTE ADULT - ASSESSMENT
26 yo M w crystal meth IV drug use, (w hx of rhabdo in 2016 from crystal meth use), HIV+ on Biktarvy (viral load undetectable 8/2023), with hx of suicide attempt and unspecified hx of psychosis/hallucinations. Pt was admitted to [facility] on 8/28 and left AMA on 8/29, after intoxicated with meth and placed a metal ring or device on his L middle and L ring fingers 2 days prior to ED presentation. Pt required procedural sedation w ketamine, ativan, and pain relief w morphine to facilitate removal of the device w lilli by ERENDIRA. He was given IV abx and then dc with Clindamycin PO when he left AMA. Blood cultures came back pos for MSSA, so pt was called to come back to ED.   Admitted for MSSA bacteremia, started on Cefazolin, ID Dr. Tavares consulted, repeat cultures in progress

## 2023-09-01 NOTE — PROGRESS NOTE ADULT - SUBJECTIVE AND OBJECTIVE BOX
Patient is a 27y old  Male who presents with a chief complaint of MSSA bacteremia (31 Aug 2023 15:33)    OVERNIGHT EVENTS: no acute events overnight, offering no new complaints     REVIEW OF SYSTEMS:  CONSTITUTIONAL: No fever, chills  NECK: No pain or stiffness  RESPIRATORY: No cough, SOB  CARDIOVASCULAR: No chest pain, palpitations  GASTROINTESTINAL: No abdominal pain. No nausea, vomiting, or diarrhea  GENITOURINARY: No dysuria  NEUROLOGICAL: No HA  MUSCULOSKELETAL: left middle finger with swelling       T(C): 36.4 (09-01-23 @ 05:26), Max: 37 (08-31-23 @ 11:59)  HR: 74 (09-01-23 @ 05:26) (74 - 100)  BP: 102/59 (09-01-23 @ 05:26) (102/59 - 135/81)  RR: 18 (09-01-23 @ 05:26) (18 - 18)  SpO2: 99% (09-01-23 @ 05:26) (98% - 100%)  Wt(kg): --Vital Signs Last 24 Hrs  T(C): 36.4 (01 Sep 2023 05:26), Max: 37 (31 Aug 2023 11:59)  T(F): 97.5 (01 Sep 2023 05:26), Max: 98.6 (31 Aug 2023 11:59)  HR: 74 (01 Sep 2023 05:26) (74 - 100)  BP: 102/59 (01 Sep 2023 05:26) (102/59 - 135/81)  BP(mean): --  RR: 18 (01 Sep 2023 05:26) (18 - 18)  SpO2: 99% (01 Sep 2023 05:26) (98% - 100%)    Parameters below as of 01 Sep 2023 05:26  Patient On (Oxygen Delivery Method): room air    MEDICATIONS  (STANDING):  bictegravir 50 mG/emtricitabine 200 mG/tenofovir alafenamide 25 mG (BIKTARVY) 1 Tablet(s) Oral daily  ceFAZolin   IVPB 2000 milliGRAM(s) IV Intermittent every 8 hours  cholecalciferol 2000 Unit(s) Oral daily  multivitamin 1 Tablet(s) Oral daily    MEDICATIONS  (PRN):  ibuprofen  Tablet. 400 milliGRAM(s) Oral every 8 hours PRN Temp greater or equal to 38C (100.4F), Mild Pain (1 - 3)  ondansetron Injectable 4 milliGRAM(s) IV Push every 8 hours PRN Nausea and/or Vomiting      PHYSICAL EXAM:  GENERAL: NAD  EYES: clear conjunctiva  ENMT: Moist mucous membranes  NECK: Supple, No JVD  CHEST/LUNG: Clear to auscultation bilaterally; No rales, rhonchi, wheezing, or rubs  HEART: S1, S2, Regular rate and rhythm  ABDOMEN: Soft, Nontender, Nondistended; Bowel sounds present  NEURO: Alert & Oriented X3  EXTREMITIES/SKIN: No LE edema, no calf tenderness,  Left middle finger with 2+edema, warm, with 2 cm wound with granulation tissue and mildly tender to palpation     Consultant(s) Notes Reviewed:  [x ] YES  [ ] NO  Care Discussed with Consultants/Other Providers [ x] YES  [ ] NO    LABS:                        13.2   5.63  )-----------( 406      ( 01 Sep 2023 06:47 )             41.0     09-01    142  |  110<H>  |  8   ----------------------------<  92  4.0   |  25  |  0.95    Ca    8.9      01 Sep 2023 06:47  Phos  3.0     09-01  Mg     2.1     09-01    TPro  7.6  /  Alb  3.3<L>  /  TBili  0.2  /  DBili  x   /  AST  34  /  ALT  33  /  AlkPhos  65  08-31    CAPILLARY BLOOD GLUCOSE  Urinalysis Basic - ( 01 Sep 2023 06:47 )  Color: x / Appearance: x / SG: x / pH: x  Gluc: 92 mg/dL / Ketone: x  / Bili: x / Urobili: x   Blood: x / Protein: x / Nitrite: x   Leuk Esterase: x / RBC: x / WBC x   Sq Epi: x / Non Sq Epi: x / Bacteria: x    RADIOLOGY & ADDITIONAL TESTS:  < from: Xray Hand 3 Views, Left (08.31.23 @ 14:18) >    ACC: [id number] EXAM:  XR HAND MIN 3 VIEWS LT   ORDERED BY: PAULO STOKES     PROCEDURE DATE:  08/31/2023          INTERPRETATION:  Left hand. Patient has local infection. 3 views. Study   of August 27 shunt dorsal and swelling.    Present examination the dorsal and swelling is diminished. Bones remain   unremarkable.    IMPRESSION: Diminished dorsal hand swelling.      < end of copied text >

## 2023-09-01 NOTE — DISCHARGE NOTE PROVIDER - NSDCCPCAREPLAN_GEN_ALL_CORE_FT
PRINCIPAL DISCHARGE DIAGNOSIS  Diagnosis: MSSA bacteremia  Assessment and Plan of Treatment: you were found to have MSSA Bacteremia which is bacteria in the blood. Bacteremia happens when germs from infections in your body travel to your blood. You were treated with antibitiocs and seen by an infectious disease specialist.   Please continue taking antibiotics as prescribed   If you have fevers, chills, change in mental status, or your heart is beating faster than usual, please seek emergent medical attention.        SECONDARY DISCHARGE DIAGNOSES  Diagnosis: Superficial wound of body region  Assessment and Plan of Treatment: continue wound care as recommended    Diagnosis: Polysubstance use disorder  Assessment and Plan of Treatment: Its is strongly recommended that you stop using IV drugs to avoid further injury to your health   Seek outpatient resources if you need help quitting    Diagnosis: HIV infection  Assessment and Plan of Treatment: continue taking Biktarvy as prescribed   Follow up with provider outpatient     PRINCIPAL DISCHARGE DIAGNOSIS  Diagnosis: MSSA bacteremia  Assessment and Plan of Treatment: you were found to have MSSA Bacteremia which is bacteria in the blood. Bacteremia happens when germs from infections in your body travel to your blood. You were treated with antibitiocs and seen by an infectious disease specialist.   Please continue taking antibiotics as prescribed   If you have fevers, chills, change in mental status, or your heart is beating faster than usual, please seek emergent medical attention.        SECONDARY DISCHARGE DIAGNOSES  Diagnosis: HIV infection  Assessment and Plan of Treatment: continue taking Biktarvy as prescribed   Follow up with provider outpatient    Diagnosis: Superficial wound of body region  Assessment and Plan of Treatment: continue wound care as recommended    Diagnosis: Polysubstance use disorder  Assessment and Plan of Treatment: Its is strongly recommended that you stop using IV drugs to avoid further injury to your health   Seek outpatient resources if you need help quitting     PRINCIPAL DISCHARGE DIAGNOSIS  Diagnosis: MSSA bacteremia  Assessment and Plan of Treatment: you were found to have MSSA Bacteremia which is bacteria in the blood. Bacteremia happens when germs from infections in your body travel to your blood. You were treated with antibitiocs and seen by an infectious disease specialist.   Please continue taking antibiotics as prescribed   YOU LEFT AGAINST MEDICAL ADVICE   If you have fevers, chills, change in mental status, or your heart is beating faster than usual, please seek emergent medical attention.        SECONDARY DISCHARGE DIAGNOSES  Diagnosis: HIV infection  Assessment and Plan of Treatment: continue taking Biktarvy as prescribed   Follow up with provider outpatient    Diagnosis: Superficial wound of body region  Assessment and Plan of Treatment: continue wound care as recommended    Diagnosis: Polysubstance use disorder  Assessment and Plan of Treatment: Its is strongly recommended that you stop using IV drugs to avoid further injury to your health   Seek outpatient resources if you need help quitting     PRINCIPAL DISCHARGE DIAGNOSIS  Diagnosis: MSSA bacteremia  Assessment and Plan of Treatment: you were found to have MSSA Bacteremia which is bacteria in the blood. Bacteremia happens when germs from infections in your body travel to your blood. You were treated with antibitiocs and seen by an infectious disease specialist.   Please continue taking antibiotics as prescribed, and please note that the oral antibiotics are generally not used for your bloodstreaminfection as it might lead to undertreatment.   YOU LEFT AGAINST MEDICAL ADVICE   If you have fevers, chills, change in mental status, or your heart is beating faster than usual, please seek emergent medical attention.        SECONDARY DISCHARGE DIAGNOSES  Diagnosis: HIV infection  Assessment and Plan of Treatment: continue taking Biktarvy as prescribed   Follow up with provider outpatient    Diagnosis: Superficial wound of body region  Assessment and Plan of Treatment: continue wound care as recommended    Diagnosis: Polysubstance use disorder  Assessment and Plan of Treatment: Its is strongly recommended that you stop using IV drugs to avoid further injury to your health   Seek outpatient resources if you need help quitting

## 2023-09-01 NOTE — PROGRESS NOTE ADULT - NS ATTEND AMEND GEN_ALL_CORE FT
I have seen and examined the patient at the bedside. I have also discussed his care in person with Infectious Disease. Dr. Haley.      He says that his left hand is still hurting, and it is exacerbated when he tries to flex his 3rd MCP and PIP. He has no other symptoms to report. No headache, chest pain, dyspnea, fever, chills. The patient is saying that he will not stay for a full 4-6 weeks course of IV antibiotics, and he will not be a candidate for outpatient IV antibiotics given his IV drug use history. He did say that he would stay inpatient while the appropriate workup is obtained/blood cultures are repeated and cleared.      On exam, patient is sitting up eating lunch in no acute distress. Tmax 98.1 F. Normal HR and BP. No murmur appreciated on auscultation. He has no splinter hemorrhages, Hirsch spots or other obvious sequelae of infective endocarditis on exam. No LE edema. No synovitis of knees and no tenderness to palpation over the lumbar or sacral spine. On exam of his left hand, there is eythema and tenderness along the left 3rd digit with an open wound expressing some purulence. He has somewhat limited range of motion of 3rd digit joints, primarily limited by pain.      I have reviewed BMP, CBC, inflammatory markers. His WBC is normal (5), ESR and CRP only modestly elevated at 16 and 11 respectively. Renal function is normal. On 8/29, his HIV viral load was undetectable and his CD4 count was normal.      Urine drug screen on 8/28 was positive for both opioids and amphetamines.      Assessment and Plan:     Patient is a 27 year old male with well controlled HIV infection on antiretroviral therapy and history of IV drug use/polysubstance use who is admitted with MSSA bacteremia. His source was likely his skin/soft tissue infection of the left hand (in addition to obvious concern of IV drug use). Will need MRI of L hand to rule out OM/septic arthritis as well as TTE to eval for infective endocarditis. Suspect patient will leave hospital prior to completion of full IV antimicrobial therapy, but will strongly advise him to stay in house.      #MSSA Bacteremia   #Left Hand Cellulitis, rule out septic arthritis   #HIV Infection (Well controlled)   #Polysubstance Use    #History of IV Drug Use     -follow up Infectious Disease recommendations   -continue IV cefazolin   -follow repeat blood cultures (8/31) for clearance of bacteremia   -TTE   -MRI L hand with IV contrast

## 2023-09-01 NOTE — DISCHARGE NOTE PROVIDER - CARE PROVIDER_API CALL
CLYDE Hewitt,   [facility] [id number]  Phone: (   )    -  Fax: (   )    -  Established Patient  Follow Up Time: 1-3 days

## 2023-09-01 NOTE — CONSULT NOTE ADULT - ASSESSMENT
HPI:  Pt is a 28 yo M w crystal meth IV drug use, (w hx of rhabdo in 2016 from crystal meth use), HIV+ on Biktarvy (viral load undetectable 8/2023), with hx of suicide attempt and unspecified hx of psychosis/hallucinations, presented with injury to his Left hand. Pt was admitted to [facility] on 8/28 and left AMA on 8/29, after intoxicated meth and placed a metal ring or device on his L middle and L ring fingers 2 days prior to ED presentation. Pt required procedural sedation w ketamine, ativan, and pain relief w morphine to facilitate removal of the device w lilli by ERENDIRA. He was given IV abx and then dc with Clindamycin PO when he left AMA. Blood cultures came back pos for MSSA, so pt was requested to come back to ED. He endorses that he last used crystal meth on the day before admission.    He is currently endorsing pain in the L middle finger on finger extension, but is otherwise denying all other ssx, including fevers, chills,    (31 Aug 2023 15:33)    Allergies: Tylenol (Unknown)    PAST MEDICAL & SURGICAL HISTORY:  Polysubstance use disorder  HIV infection  Suicide attempt  Major depression    SOCIAL HISTORY: No smoking ;no alcohol abuse, no IVDU  FAMILY HISTORY: no pertinent related medical history    REVIEW OF SYSTEMS:  CONSTITUTIONAL:  No fevers or chills, good appetite, good general state  EYES/ENT:  No visual changes;  No vertigo or throat pain   NECK:  No neck pain or stiffness  RESPIRATORY:  No cough, wheezing, hemoptysis; No shortness of breath  CARDIOVASCULAR:  No chest pain or palpitations  GASTROINTESTINAL:  No abdominal pain. No nausea, vomiting, or hematemesis; No diarrhea or constipation. No melena or hematochezia.  GENITOURINARY:  No dysuria, frequency or hematuria  NEUROLOGICAL:  No HA, no numbness or LE weakness  MSK: no back pain, no joint pain  SKIN:  No itching, no skin rash    PHYSICAL EXAM:  VITALS: Vital Signs Last 24 Hrs  T(C): 36.4 (01 Sep 2023 05:26), Max: 37 (31 Aug 2023 11:59)  T(F): 97.5 (01 Sep 2023 05:26), Max: 98.6 (31 Aug 2023 11:59)  HR: 74 (01 Sep 2023 05:26) (74 - 100)  BP: 102/59 (01 Sep 2023 05:26) (102/59 - 135/81)  BP(mean): --  RR: 18 (01 Sep 2023 05:26) (18 - 18)  SpO2: 99% (01 Sep 2023 05:26) (98% - 100%)    Parameters below as of 01 Sep 2023 05:26  Patient On (Oxygen Delivery Method): room air      Gen: AOx3, NAD, non-toxic, pleasant  HEAD:  Atraumatic, Normocephalic  EYES: PERRLA, conjunctiva and sclera clear  ENT: Moist mucous membranes  NECK: Supple, No JVD  CV: S1+S2 normal, no murmurs   Resp: Clear bilat, no resp distress, no crackles/wheezes  Abd: Soft, nontender, +BS  Ext: No LE edema, no cyanosis, LE pulses present  : no dysuria, no gross hematuria, Conley (+/-)  IV/Skin: No thrombophlebitis, no skin rash  Msk: No low back pain, no arthralgias, no joint swelling  Neuro: AAOx3. No sensory deficits, no motor deficits  Psych: no anxiety, no delusional ideas, no suicidal ideation    LABS/DIAGNOSTIC TESTS:                        13.2   5.63  )-----------( 406      ( 01 Sep 2023 06:47 )             41.0     WBC Count: 5.63 K/uL (09-01 @ 06:47)  WBC Count: 7.43 K/uL (08-31 @ 13:10)    09-01    142  |  110<H>  |  8   ----------------------------<  92  4.0   |  25  |  0.95    Ca    8.9      01 Sep 2023 06:47  Phos  3.0     09-01  Mg     2.1     09-01    TPro  7.6  /  Alb  3.3<L>  /  TBili  0.2  /  DBili  x   /  AST  34  /  ALT  33  /  AlkPhos  65  08-31    Urinalysis Basic - ( 01 Sep 2023 06:47 )    Color: x / Appearance: x / SG: x / pH: x  Gluc: 92 mg/dL / Ketone: x  / Bili: x / Urobili: x   Blood: x / Protein: x / Nitrite: x   Leuk Esterase: x / RBC: x / WBC x   Sq Epi: x / Non Sq Epi: x / Bacteria: x      LACTATE:Lactate, Blood: 1.7 mmol/L (08-31 @ 13:10)      CULTURES:     Culture - Blood (collected 08-28-23 @ 11:26)  Source: .Blood Blood-Venous  Preliminary Report (08-31-23 @ 19:01):    No growth at 72 Hours    Culture - Blood (collected 08-28-23 @ 11:16)  Source: .Blood Blood-Peripheral  Gram Stain (08-30-23 @ 10:29):    Growth in anaerobic bottle: Gram Positive Cocci in Clusters  Final Report (09-01-23 @ 10:31):    Growth in anaerobic bottle: Staphylococcus aureus    Direct identification is available within approximately 3-5    hours either by Blood Panel Multiplexed PCR or Direct    MALDI-TOF. Details: [email or web address]  Organism: Blood Culture PCR  Staphylococcus aureus (09-01-23 @ 10:31)  Organism: Staphylococcus aureus (09-01-23 @ 10:31)      -  Clindamycin: R <=0.25 This isolate is presumed to be clindamycin resistant based on detection of inducible resistance. Clindamycin may still be effective in some patients.      -  Oxacillin: S <=0.25 Oxacillin predicts susceptibility for dicloxacillin, methicillin, and nafcillin      -  Gentamicin: R >8 Should not be used as monotherapy      -  Cefazolin: S <=4      -  Vancomycin: S 1      -  Tetracycline: S <=1      Method Type: VENTURA      -  Ampicillin/Sulbactam: S <=8/4      -  Penicillin: R 2      -  Rifampin: S <=1 Should not be used as monotherapy      -  Erythromycin: R >4      -  Trimethoprim/Sulfamethoxazole: S <=0.5/9.5  Organism: Blood Culture PCR (09-01-23 @ 10:31)      Method Type: PCR      -  Methicillin SENSITIVE Staphylococcus aureus (MSSA): Detec Any isolate of Staphylococcus aureus from a blood culture is NOT considered a contaminant.    HIV-1 RNA Quantitative, Viral Load Log: NOT DET. lg /mL (08-29-23 @ 10:30)  Full T Cell Subset (08.29.23 @ 10:30)    CD3 %: 77   ABS CD3: 665 cells/uL   CD4 %: 43 %   ABS CD4: 367 cells/uL   CD8 %: 32 %   ABS CD8: 274 cells/uL   CD19 %: 10 %   ABS CD19: 87 cells/uL   PW1524 %: 12 %   ABS BP9001: 109 cells/uL    RADIOLOGY: < from: Xray Hand 3 Views, Left (08.31.23 @ 14:18) >   Left hand. Patient has local infection. 3 views. Study   of August 27 shunt dorsal and swelling.    Present examination the dorsal and swelling is diminished. Bones remain   unremarkable.    IMPRESSION: Diminished dorsal hand swelling.    ANTIBIOTICS:  bictegravir 50 mG/emtricitabine 200 mG/tenofovir alafenamide 25 mG (BIKTARVY) 1 daily  ceFAZolin   IVPB 2000 every 8 hours  Procalcitonin, Serum: 0.10 ng/mL (08-27-23 @ 23:10)  Procalcitonin, Serum: 0.09 ng/mL (08-31-23 @ 17:17)  Sedimentation Rate, Erythrocyte: 16 mm/Hr *H* (08-31-23 @ 17:17)  C-Reactive Protein, Serum: 11 mg/L *H* (08-31-23 @ 17:17)    IMPRESSION:  28 yo male with history of PSA w/crystal meth IVDU, Depression with previous suicidal attempt,  HIV + on Biktarvy CD4 367 and VL undetectable, presented to [facility] on 8/28 with complains of L hand swelling,reporting metal ring device encased around his L hand unclear if due to attempt of drug skin popping. At the time he was actively using drugs.  Metal device around his hand removed(with difficulty).  ID consulted for L hand cellulitis, was started on IV vancomycin.   Pt d/c on clindamycin for cellulitis, later called back due to bacteremia (MSSA on BC 8/28).    -BSI - MSSA source skin infection(L hand cellulitis +/- IVDU)  -PSA- Opioids and amphetamines   -L hand cellulitis (after traumatic injury unclear if also skin popping of drugs)    PLAN:      Please reach ID with any questions or concerns  Dr. Shell Capone  Available in Teams               HPI:  Pt is a 28 yo M w crystal meth IV drug use, (w hx of rhabdo in 2016 from crystal meth use), HIV+ on Biktarvy (viral load undetectable 8/2023), with hx of suicide attempt and unspecified hx of psychosis/hallucinations, presented with injury to his Left hand. [facility] and left AMA on 8/29, after intoxicated meth and placed a metal ring or device on his L middle and L ring fingers 2 days prior to ED presentation. Pt required procedural sedation w ketamine, ativan, and pain relief w morphine to facilitate removal of the device w lilli by ERENDIRA. He was given IV abx and then dc with Clindamycin PO when he left AMA. Blood cultures came back pos for MSSA, so pt was requested to come back to ED. He endorses that he last used crystal meth on the day before admission.    He is currently endorsing pain in the L middle finger on finger extension, but is otherwise denying all other ssx, including fevers, chills,    (31 Aug 2023 15:33)    Allergies: Tylenol (Unknown)    PAST MEDICAL & SURGICAL HISTORY:  Polysubstance use disorder  HIV infection  Suicide attempt  Major depression    SOCIAL HISTORY: No smoking ;no alcohol abuse, no IVDU  FAMILY HISTORY: no pertinent related medical history    REVIEW OF SYSTEMS:  CONSTITUTIONAL:  No fevers or chills, good appetite, good general state  EYES/ENT:  No visual changes;  No vertigo or throat pain   NECK:  No neck pain or stiffness  RESPIRATORY:  No cough, wheezing, hemoptysis; No shortness of breath  CARDIOVASCULAR:  No chest pain or palpitations  GASTROINTESTINAL:  No abdominal pain. No nausea, vomiting, or hematemesis; No diarrhea or constipation. No melena or hematochezia.  GENITOURINARY:  No dysuria, frequency or hematuria  NEUROLOGICAL:  No HA, no numbness or LE weakness  MSK: no back pain, left 3rd dorsal digit with redness, swelling and pain  SKIN:  No itching, no skin rash    PHYSICAL EXAM:  VITALS: Vital Signs Last 24 Hrs  T(C): 36.4 (01 Sep 2023 05:26), Max: 37 (31 Aug 2023 11:59)  T(F): 97.5 (01 Sep 2023 05:26), Max: 98.6 (31 Aug 2023 11:59)  HR: 74 (01 Sep 2023 05:26) (74 - 100)  BP: 102/59 (01 Sep 2023 05:26) (102/59 - 135/81)  RR: 18 (01 Sep 2023 05:26) (18 - 18)  SpO2: 99% (01 Sep 2023 05:26) (98% - 100%)    Parameters below as of 01 Sep 2023 05:26 Patient On (Oxygen Delivery Method): room air    Gen: AOx3, NAD, non-toxic, pleasant        HEAD:  Atraumatic, Normocephalic  EYES: PERRLA, conjunctiva and sclera clear  ENT: Moist mucous membranes  NECK: Supple, No JVD  CV: S1+S2 normal, no murmurs   Resp: Clear bilat, no resp distress, no crackles/wheezes  Abd: Soft, nontender, +BS  Ext: No LE edema, no cyanosis, LE pulses present  : no dysuria, no gross hematuria   IV/Skin: No thrombophlebitis, no skin rash, no ischemic lesions  Msk: No low back pain, no arthralgias       L 3rd digit with erythema, calor and edema , there is a wound on dorsal proximal MTP with some yellow fibrinous tissue   Neuro: AAOx3. No sensory deficits, no motor deficits  Psych: no anxiety, no delusional ideas, no suicidal ideation    LABS/DIAGNOSTIC TESTS:                        13.2   5.63  )-----------( 406      ( 01 Sep 2023 06:47 )             41.0     WBC Count: 5.63 K/uL (09-01 @ 06:47)  WBC Count: 7.43 K/uL (08-31 @ 13:10)    09-01    142  |  110<H>  |  8   ----------------------------<  92  4.0   |  25  |  0.95    Ca    8.9      01 Sep 2023 06:47  Phos  3.0     09-01  Mg     2.1     09-01    TPro  7.6  /  Alb  3.3<L>  /  TBili  0.2  /  DBili  x   /  AST  34  /  ALT  33  /  AlkPhos  65  08-31    LACTATE:Lactate, Blood: 1.7 mmol/L (08-31 @ 13:10)    CULTURES:   Culture - Blood (collected 08-28-23 @ 11:26)  Source: .Blood Blood-Venous  Preliminary Report (08-31-23 @ 19:01):    No growth at 72 Hours    Culture - Blood (collected 08-28-23 @ 11:16)  Source: .Blood Blood-Peripheral  Gram Stain (08-30-23 @ 10:29):    Growth in anaerobic bottle: Gram Positive Cocci in Clusters  Final Report (09-01-23 @ 10:31):    Growth in anaerobic bottle: Staphylococcus aureus    Direct identification is available within approximately 3-5    hours either by Blood Panel Multiplexed PCR or Direct    MALDI-TOF. Details: [email or web address]  Organism: Blood Culture PCR  Staphylococcus aureus (09-01-23 @ 10:31)  Organism: Staphylococcus aureus (09-01-23 @ 10:31)      -  Clindamycin: R <=0.25 This isolate is presumed to be clindamycin resistant based on detection of inducible resistance. Clindamycin may still be effective in some patients.      -  Oxacillin: S <=0.25 Oxacillin predicts susceptibility for dicloxacillin, methicillin, and nafcillin      -  Gentamicin: R >8 Should not be used as monotherapy      -  Cefazolin: S <=4      -  Vancomycin: S 1      -  Tetracycline: S <=1      Method Type: VENTURA      -  Ampicillin/Sulbactam: S <=8/4      -  Penicillin: R 2      -  Rifampin: S <=1 Should not be used as monotherapy      -  Erythromycin: R >4      -  Trimethoprim/Sulfamethoxazole: S <=0.5/9.5  Organism: Blood Culture PCR (09-01-23 @ 10:31)      Method Type: PCR      -  Methicillin SENSITIVE Staphylococcus aureus (MSSA): Detec Any isolate of Staphylococcus aureus from a blood culture is NOT considered a contaminant.    HIV-1 RNA Quantitative, Viral Load Log: NOT DET. lg /mL (08-29-23 @ 10:30)  Full T Cell Subset (08.29.23 @ 10:30)    CD3 %: 77   ABS CD3: 665 cells/uL   CD4 %: 43 %   ABS CD4: 367 cells/uL   CD8 %: 32 %   ABS CD8: 274 cells/uL   CD19 %: 10 %   ABS CD19: 87 cells/uL   [id number] %: 12 %   ABS [id number]: 109 cells/uL    RADIOLOGY: < from: Xray Hand 3 Views, Left (08.31.23 @ 14:18) >  Left hand. Patient has local infection. 3 views. Study of August 27 shunt dorsal and swelling.  Present examination the dorsal and swelling is diminished. Bones remain unremarkable.  IMPRESSION: Diminished dorsal hand swelling.    ANTIBIOTICS:  bictegravir 50 mG/emtricitabine 200 mG/tenofovir alafenamide 25 mG (BIKTARVY) 1 daily  ceFAZolin   IVPB 2000 every 8 hours  Procalcitonin, Serum: 0.10 ng/mL (08-27-23 @ 23:10)  Procalcitonin, Serum: 0.09 ng/mL (08-31-23 @ 17:17)  Sedimentation Rate, Erythrocyte: 16 mm/Hr *H* (08-31-23 @ 17:17)  C-Reactive Protein, Serum: 11 mg/L *H* (08-31-23 @ 17:17)    IMPRESSION:  28 yo male with history of PSA w/crystal meth IVDU, Depression with previous suicidal attempt,  HIV + on Biktarvy CD4 367 and VL undetectable, [facility] with complains of L hand swelling,reporting metal ring device encased around his L hand unclear if due to attempt of drug skin popping. At the time he was actively using drugs.  Metal device around his hand removed(with difficulty).  ID consulted for L hand cellulitis, was started on IV vancomycin.   Pt d/c on clindamycin for cellulitis, later called back due to bacteremia (MSSA on BC 8/28).    -BSI - MSSA source skin infection(L hand cellulitis +/- IVDU)  -PSA- Opioids and amphetamines   -L hand cellulitis (after traumatic injury unclear if also skin popping of drugs)    PLAN:  Continue Cefazolin 2g q8 hrs IV  Repeat blood cultures 2 sets  ECHO, if negative GAYLE  Trend inflammatory markers  Continue Biktarvy 1tab po q24 hrs  MRI L hand with contrast to r/o OM/septic arthritis.   Counseled about PSA pt is only interested on going home as soon as possible stating that all this information is too much to take, he mentions he has always been careful using clean needles and he never had an issue like this, he believes we are overreacting with the severity of this infection. I state that I am presenting the facts and recommendations regarding what means MSSA bacteremia and IE in the setting of IVDU +/- current SSTI. The duration of the abx tx for this condition are 4-6 weeks depending on severity or the presence or IE, seeding or other complications, starting from the moment the blood cx are negative. There is no PO recommendation and in view of his active IVDU he is not a good candidate for OPAT with PICC line.   Consider SW evaluation and resources about drug abuse.   Case discussed with Dr. Moreira.    Please reach ID with any questions or concerns  Dr. Shell Capone  Available in Teams               HPI:  Pt is a 26 yo M w crystal meth IV drug use, (w hx of rhabdo in 2016 from crystal meth use), HIV+ on Biktarvy (viral load undetectable 8/2023), with hx of suicide attempt and unspecified hx of psychosis/hallucinations, presented with injury to his Left hand. Pt was admitted to [facility] on 8/28 and left AMA on 8/29, after intoxicated meth and placed a metal ring or device on his L middle and L ring fingers 2 days prior to ED presentation. Pt required procedural sedation w ketamine, ativan, and pain relief w morphine to facilitate removal of the device w lilli by ERENDIRA. He was given IV abx and then dc with Clindamycin PO when he left AMA. Blood cultures came back pos for MSSA, so pt was requested to come back to ED. He endorses that he last used crystal meth on the day before admission.    He is currently endorsing pain in the L middle finger on finger extension, but is otherwise denying all other ssx, including fevers, chills,    (31 Aug 2023 15:33)    Allergies: Tylenol (Unknown)    PAST MEDICAL & SURGICAL HISTORY:  Polysubstance use disorder  HIV infection  Suicide attempt  Major depression    SOCIAL HISTORY: No smoking ;no alcohol abuse, no IVDU  FAMILY HISTORY: no pertinent related medical history    REVIEW OF SYSTEMS:  CONSTITUTIONAL:  No fevers or chills, good appetite, good general state  EYES/ENT:  No visual changes;  No vertigo or throat pain   NECK:  No neck pain or stiffness  RESPIRATORY:  No cough, wheezing, hemoptysis; No shortness of breath  CARDIOVASCULAR:  No chest pain or palpitations  GASTROINTESTINAL:  No abdominal pain. No nausea, vomiting, or hematemesis; No diarrhea or constipation. No melena or hematochezia.  GENITOURINARY:  No dysuria, frequency or hematuria  NEUROLOGICAL:  No HA, no numbness or LE weakness  MSK: no back pain, left 3rd dorsal digit with redness, swelling and pain  SKIN:  No itching, no skin rash    PHYSICAL EXAM:  VITALS: Vital Signs Last 24 Hrs  T(C): 36.4 (01 Sep 2023 05:26), Max: 37 (31 Aug 2023 11:59)  T(F): 97.5 (01 Sep 2023 05:26), Max: 98.6 (31 Aug 2023 11:59)  HR: 74 (01 Sep 2023 05:26) (74 - 100)  BP: 102/59 (01 Sep 2023 05:26) (102/59 - 135/81)  RR: 18 (01 Sep 2023 05:26) (18 - 18)  SpO2: 99% (01 Sep 2023 05:26) (98% - 100%)    Parameters below as of 01 Sep 2023 05:26 Patient On (Oxygen Delivery Method): room air    Gen: AOx3, NAD, non-toxic, pleasant        HEAD:  Atraumatic, Normocephalic  EYES: PERRLA, conjunctiva and sclera clear  ENT: Moist mucous membranes  NECK: Supple, No JVD  CV: S1+S2 normal, no murmurs   Resp: Clear bilat, no resp distress, no crackles/wheezes  Abd: Soft, nontender, +BS  Ext: No LE edema, no cyanosis, LE pulses present  : no dysuria, no gross hematuria   IV/Skin: No thrombophlebitis, no skin rash, no ischemic lesions  Msk: No low back pain, no arthralgias       L 3rd digit with erythema, calor and edema , there is a wound on dorsal proximal MTP with some yellow fibrinous tissue   Neuro: AAOx3. No sensory deficits, no motor deficits  Psych: no anxiety, no delusional ideas, no suicidal ideation    LABS/DIAGNOSTIC TESTS:                        13.2   5.63  )-----------( 406      ( 01 Sep 2023 06:47 )             41.0     WBC Count: 5.63 K/uL (09-01 @ 06:47)  WBC Count: 7.43 K/uL (08-31 @ 13:10)    09-01    142  |  110<H>  |  8   ----------------------------<  92  4.0   |  25  |  0.95    Ca    8.9      01 Sep 2023 06:47  Phos  3.0     09-01  Mg     2.1     09-01    TPro  7.6  /  Alb  3.3<L>  /  TBili  0.2  /  DBili  x   /  AST  34  /  ALT  33  /  AlkPhos  65  08-31    LACTATE:Lactate, Blood: 1.7 mmol/L (08-31 @ 13:10)    CULTURES:   Culture - Blood (collected 08-28-23 @ 11:26)  Source: .Blood Blood-Venous  Preliminary Report (08-31-23 @ 19:01):    No growth at 72 Hours    Culture - Blood (collected 08-28-23 @ 11:16)  Source: .Blood Blood-Peripheral  Gram Stain (08-30-23 @ 10:29):    Growth in anaerobic bottle: Gram Positive Cocci in Clusters  Final Report (09-01-23 @ 10:31):    Growth in anaerobic bottle: Staphylococcus aureus    Direct identification is available within approximately 3-5    hours either by Blood Panel Multiplexed PCR or Direct    MALDI-TOF. Details: [email or web address]  Organism: Blood Culture PCR  Staphylococcus aureus (09-01-23 @ 10:31)  Organism: Staphylococcus aureus (09-01-23 @ 10:31)      -  Clindamycin: R <=0.25 This isolate is presumed to be clindamycin resistant based on detection of inducible resistance. Clindamycin may still be effective in some patients.      -  Oxacillin: S <=0.25 Oxacillin predicts susceptibility for dicloxacillin, methicillin, and nafcillin      -  Gentamicin: R >8 Should not be used as monotherapy      -  Cefazolin: S <=4      -  Vancomycin: S 1      -  Tetracycline: S <=1      Method Type: VENTURA      -  Ampicillin/Sulbactam: S <=8/4      -  Penicillin: R 2      -  Rifampin: S <=1 Should not be used as monotherapy      -  Erythromycin: R >4      -  Trimethoprim/Sulfamethoxazole: S <=0.5/9.5  Organism: Blood Culture PCR (09-01-23 @ 10:31)      Method Type: PCR      -  Methicillin SENSITIVE Staphylococcus aureus (MSSA): Detec Any isolate of Staphylococcus aureus from a blood culture is NOT considered a contaminant.    HIV-1 RNA Quantitative, Viral Load Log: NOT DET. lg /mL (08-29-23 @ 10:30)  Full T Cell Subset (08.29.23 @ 10:30)    CD3 %: 77   ABS CD3: 665 cells/uL   CD4 %: 43 %   ABS CD4: 367 cells/uL   CD8 %: 32 %   ABS CD8: 274 cells/uL   CD19 %: 10 %   ABS CD19: 87 cells/uL   TC7611 %: 12 %   ABS [id number]: 109 cells/uL    RADIOLOGY: < from: Xray Hand 3 Views, Left (08.31.23 @ 14:18) >  Left hand. Patient has local infection. 3 views. Study of August 27 shunt dorsal and swelling.  Present examination the dorsal and swelling is diminished. Bones remain unremarkable.  IMPRESSION: Diminished dorsal hand swelling.    ANTIBIOTICS:  bictegravir 50 mG/emtricitabine 200 mG/tenofovir alafenamide 25 mG (BIKTARVY) 1 daily  ceFAZolin   IVPB 2000 every 8 hours  Procalcitonin, Serum: 0.10 ng/mL (08-27-23 @ 23:10)  Procalcitonin, Serum: 0.09 ng/mL (08-31-23 @ 17:17)  Sedimentation Rate, Erythrocyte: 16 mm/Hr *H* (08-31-23 @ 17:17)  C-Reactive Protein, Serum: 11 mg/L *H* (08-31-23 @ 17:17)    IMPRESSION:  26 yo male with history of PSA w/crystal meth IVDU, Depression with previous suicidal attempt,  HIV + on Biktarvy CD4 367 and VL undetectable, presented to [facility] on 8/28 with complains of L hand swelling,reporting metal ring device encased around his L hand unclear if due to attempt of drug skin popping. At the time he was actively using drugs.  Metal device around his hand removed(with difficulty).  ID consulted for L hand cellulitis, was started on IV vancomycin.   Pt d/c on clindamycin for cellulitis, later called back due to bacteremia (MSSA on BC 8/28).    -BSI - MSSA source skin infection(L hand cellulitis +/- IVDU)  -PSA- Opioids and amphetamines   -L hand cellulitis (after traumatic injury unclear if also skin popping of drugs)    PLAN:  Continue Cefazolin 2g q8 hrs IV  Repeat blood cultures 2 sets  ECHO, if negative GAYLE  Trend inflammatory markers  Continue Biktarvy 1tab po q24 hrs  MRI L hand with contrast to r/o OM/septic arthritis.   Counseled about PSA pt is only interested on going home as soon as possible stating that all this information is too much to take, he mentions he has always been careful using clean needles and he never had an issue like this, he believes we are overreacting with the severity of this infection. I stated that I am presenting the facts and recommendations regarding what means MSSA bacteremia and IE in the setting of IVDU + current SSTI. The duration of the abx tx for this type of conditiosn is 4-6 weeks depending on severity or the presence or IE, seeding or other complications, starting from the moment the blood cx are negative. There is no PO recommendation and in view of his active IVDU he is not a good candidate for OPAT with PICC line.   Consider SW evaluation and resources about drug abuse.   Case discussed with Dr. Moreira.    Please reach ID with any questions or concerns  Dr. Shell Capone  Available in Teams

## 2023-09-01 NOTE — DISCHARGE NOTE PROVIDER - NSDCMRMEDTOKEN_GEN_ALL_CORE_FT
Biktarvy 50 mg-200 mg-25 mg oral tablet: 1 tab(s) orally once a day  cholecalciferol 50 mcg (2000 intl units) oral tablet: 1 tab(s) orally once a day  clindamycin 300 mg oral capsule: 1 cap(s) orally 3 times a day  Multiple Vitamins oral tablet: 1 tab(s) orally once a day   bacitracin 500 units/g topical ointment: Apply topically to affected area once a day Apply to affected area  Biktarvy 50 mg-200 mg-25 mg oral tablet: 1 tab(s) orally once a day  cholecalciferol 50 mcg (2000 intl units) oral tablet: 1 tab(s) orally once a day  ibuprofen 400 mg oral tablet: 1 tab(s) orally every 8 hours As needed Temp greater or equal to 38C (100.4F), Mild Pain (1 - 3)  minocycline 100 mg oral tablet: 1 tab(s) orally every 12 hours While on this medication: Take with meals and avoid sun exposure  Multiple Vitamins oral tablet: 1 tab(s) orally once a day

## 2023-09-01 NOTE — DISCHARGE NOTE PROVIDER - HOSPITAL COURSE
26 yo M w crystal meth IV drug use, (w hx of rhabdo in 2016 from crystal meth use), HIV+ on Biktarvy (viral load undetectable 8/2023), with hx of suicide attempt and unspecified hx of psychosis/hallucinations. Pt was admitted to [facility] on 8/28 and left AMA on 8/29, after intoxicated with meth and placed a metal ring or device on his L middle and L ring fingers 2 days prior to ED presentation. Pt required procedural sedation w ketamine, ativan, and pain relief w morphine to facilitate removal of the device w lilli by ERENDIRA. He was given IV abx and then dc with Clindamycin PO when he left AMA. Blood cultures came back pos for MSSA, so pt was called to come back to ED.   Admitted for MSSA bacteremia, started on Cefazolin, ID Dr. Tavares consulted, repeat cultures showed ???????????  Echo showed ????????????  Pt needs abx until ????????????  MRI of left hand to rule out osteo showed ??????????????      NOT COMPLETE 9/1  Please note that this a brief summary of hospital course please refer to daily progress notes and consult notes for full course and events   28 yo M w crystal meth IV drug use, (w hx of rhabdo in 2016 from crystal meth use), HIV+ on Biktarvy (viral load undetectable 8/2023), with hx of suicide attempt and unspecified hx of psychosis/hallucinations. Pt was admitted to [facility] on 8/28 and left AMA on 8/29, after intoxicated with meth and placed a metal ring or device on his L middle and L ring fingers 2 days prior to ED presentation. Pt required procedural sedation w ketamine, ativan, and pain relief w morphine to facilitate removal of the device w lilli by ERENDIRA. He was given IV abx and then dc with Clindamycin PO when he left AMA. Blood cultures came back pos for MSSA, so pt was called to come back to ED.   Admitted for MSSA bacteremia, started on Cefazolin, ID Dr. Tavares consulted, repeat cultures resulted negative   Echo showed ????????????  Pt needs abx until ????????????  MRI of left hand to rule out osteo showed ??????????????      NOT COMPLETE 9/2  Please note that this a brief summary of hospital course please refer to daily progress notes and consult notes for full course and events   26 yo M w crystal meth IV drug use, (w hx of rhabdo in 2016 from crystal meth use), HIV+ on Biktarvy (viral load undetectable 8/2023), with hx of suicide attempt and unspecified hx of psychosis/hallucinations. Pt was admitted to [facility] on 8/28 and left AMA on 8/29, after intoxicated with meth and placed a metal ring or device on his L middle and L ring fingers 2 days prior to ED presentation. Pt required procedural sedation w ketamine, ativan, and pain relief w morphine to facilitate removal of the device w lilli by ERENDIRA. He was given IV abx and then dc with Clindamycin PO when he left AMA. Blood cultures came back pos for MSSA, so pt was called to come back to ED.   Admitted for MSSA bacteremia, started on Cefazolin, ID Dr. Tavares consulted, repeat cultures resulted negative   Echo showed no concern for endocarditis   MRI of left hand  concerning Osteomyelitis       updated on 9/7  Please note that this a brief summary of hospital course please refer to daily progress notes and consult notes for full course and events   28 yo M w crystal meth IV drug use, (w hx of rhabdo in 2016 from crystal meth use), HIV+ on Biktarvy (viral load undetectable 8/2023), with hx of suicide attempt and unspecified hx of psychosis/hallucinations. Pt was admitted to [facility] on 8/28 and left AMA on 8/29, after intoxicated with meth and placed a metal ring or device on his L middle and L ring fingers 2 days prior to ED presentation. Pt required procedural sedation w ketamine, ativan, and pain relief w morphine to facilitate removal of the device w lilli by ERENDIRA. He was given IV abx and then dc with Clindamycin PO when he left AMA. Blood cultures came back pos for MSSA, so pt was called to come back to ED.   Admitted for MSSA bacteremia, started on Cefazolin, ID Dr. Tavares consulted, repeat cultures resulted negative   TTE and GAYLE negative for vegetations. MRI of left hand  concerning Osteomyelitis .Continue Cefazolin 2g q8 hrs IV for 4 weeks(for the bacteremia) form negative blood cx (8/31-9/28) for the MSSA bacteremia after which can transition to Minocycline 100 mg q12 hrs PO for 2 more weeks( 6 weeks for the L 3rd digit OM).  Weekly labs CBC w/diff, CMP, ESR and CRP.  When transition to minocycline advise pt to take it with meals and to avoid sun exposure. Wound Care with bacitracin to left 3rd finger.   Drug detox and rehab    Pt left AMA despite discussion of risks .       Please note that this a brief summary of hospital course please refer to daily progress notes and consult notes for full course and events   28 yo M w crystal meth IV drug use, (w hx of rhabdo in 2016 from crystal meth use), HIV+ on Biktarvy (viral load undetectable 8/2023), with hx of suicide attempt and unspecified hx of psychosis/hallucinations. Pt was admitted to [facility] on 8/28 and left AMA on 8/29, after intoxicated with meth and placed a metal ring or device on his L middle and L ring fingers 2 days prior to ED presentation. Pt required procedural sedation w ketamine, ativan, and pain relief w morphine to facilitate removal of the device w lilli by ERENDIRA. He was given IV abx and then dc with Clindamycin PO when he left AMA. Blood cultures came back pos for MSSA, so pt was called to come back to ED.   Admitted for MSSA bacteremia, started on Cefazolin, ID Dr. Tavares consulted, repeat cultures collected 8/31 resulted negative.   TTE and GAYLE negative for vegetations. MRI of left hand with possible signs of osteomyelitis.   Plan was made to continue Cefazolin 2g q8 hrs IV for 4 weeks from negative blood cx (8/31-9/28) for the MSSA bacteremia after which can transition to Minocycline 100 mg q12 hrs PO for 2 more weeks( 6 weeks for the L 3rd digit OM).  Weekly labs CBC w/diff, CMP, ESR and CRP.  When transition to minocycline advise pt to take it with meals and to avoid sun exposure. Wound Care with bacitracin to left 3rd finger.   Drug detox and rehab.  On 9/10, patient adamantly desired leaving the hospital, despite the explanation on the indication of IV abx and risk of doing so. Pt verbalized understanding of the risk, and the signed the AMA form, and was discharged AMA.  Minocycline 100mg BID to complete the abx course was prescribed, but patient was explained that the regimen potentially cannot treat his bloodstream infection, and he again verbalized understanding.

## 2023-09-01 NOTE — SBIRT NOTE ADULT - NSSBIRTDRGBRIEFINTDET_GEN_A_CORE
Counseling and education provided  to patient around his harmful drug usage.  Patient reported that he  was referred to Mandated drug treatment program by his    which will start next week

## 2023-09-02 LAB
CULTURE RESULTS: SIGNIFICANT CHANGE UP
SPECIMEN SOURCE: SIGNIFICANT CHANGE UP

## 2023-09-02 PROCEDURE — 99232 SBSQ HOSP IP/OBS MODERATE 35: CPT

## 2023-09-02 PROCEDURE — 93306 TTE W/DOPPLER COMPLETE: CPT | Mod: 26

## 2023-09-02 RX ADMIN — Medication 400 MILLIGRAM(S): at 17:15

## 2023-09-02 RX ADMIN — Medication 100 MILLIGRAM(S): at 21:32

## 2023-09-02 RX ADMIN — Medication 100 MILLIGRAM(S): at 05:53

## 2023-09-02 RX ADMIN — Medication 400 MILLIGRAM(S): at 17:45

## 2023-09-02 RX ADMIN — Medication 400 MILLIGRAM(S): at 09:11

## 2023-09-02 RX ADMIN — Medication 2000 UNIT(S): at 11:41

## 2023-09-02 RX ADMIN — Medication 1 APPLICATION(S): at 11:39

## 2023-09-02 RX ADMIN — Medication 400 MILLIGRAM(S): at 08:41

## 2023-09-02 RX ADMIN — Medication 100 MILLIGRAM(S): at 13:56

## 2023-09-02 RX ADMIN — BICTEGRAVIR SODIUM, EMTRICITABINE, AND TENOFOVIR ALAFENAMIDE FUMARATE 1 TABLET(S): 30; 120; 15 TABLET ORAL at 11:41

## 2023-09-02 NOTE — PROGRESS NOTE ADULT - SUBJECTIVE AND OBJECTIVE BOX
S: Patient is resting comfortably in bed with no new symptoms. He says toady - in a different tone than yesterday - that he is willing to stay longer for imaging, blood culture clearance and IV antibiotics     O:  Vital Signs Last 24 Hrs  T(C): 36.7 (02 Sep 2023 05:17), Max: 36.7 (01 Sep 2023 20:18)  T(F): 98 (02 Sep 2023 05:17), Max: 98 (01 Sep 2023 20:18)  HR: 62 (02 Sep 2023 05:17) (58 - 65)  BP: 109/45 (02 Sep 2023 05:17) (105/58 - 111/62)  BP(mean): --  RR: 16 (02 Sep 2023 05:17) (16 - 18)  SpO2: 96% (02 Sep 2023 05:17) (96% - 100%)    Parameters below as of 02 Sep 2023 05:17  Patient On (Oxygen Delivery Method): room air        GENERAL: NAD, well-developed  HEAD:  Atraumatic, Normocephalic  EYES: EOMI, PERRLA, conjunctiva and sclera clear  NECK: Supple, No JVD  CHEST/LUNG: Clear to auscultation bilaterally; No wheeze  HEART: Regular rate and rhythm; No murmurs, rubs, or gallops  ABDOMEN: Soft, Nontender, Nondistended; Bowel sounds present  EXTREMITIES:  2+ Peripheral Pulses, No clubbing, cyanosis, or edema  PSYCH: AAOx3  NEUROLOGY: non-focal  SKIN: No rashes or lesions    bacitracin   Ointment 1 Application(s) Topical daily  bictegravir 50 mG/emtricitabine 200 mG/tenofovir alafenamide 25 mG (BIKTARVY) 1 Tablet(s) Oral daily  ceFAZolin   IVPB 2000 milliGRAM(s) IV Intermittent every 8 hours  cholecalciferol 2000 Unit(s) Oral daily  ibuprofen  Tablet. 400 milliGRAM(s) Oral every 8 hours PRN  multivitamin 1 Tablet(s) Oral daily  ondansetron Injectable 4 milliGRAM(s) IV Push every 8 hours PRN                            13.2   5.63  )-----------( 406      ( 01 Sep 2023 06:47 )             41.0       09-01    142  |  110<H>  |  8   ----------------------------<  92  4.0   |  25  |  0.95    Ca    8.9      01 Sep 2023 06:47  Phos  3.0     09-01  Mg     2.1     09-01    TPro  7.6  /  Alb  3.3<L>  /  TBili  0.2  /  DBili  x   /  AST  34  /  ALT  33  /  AlkPhos  65  08-31   S: Patient is resting comfortably in bed with no new symptoms. He says toady - in a different tone than yesterday - that he is willing to stay longer for imaging, blood culture clearance and IV antibiotics     O:  Vital Signs Last 24 Hrs  T(C): 36.7 (02 Sep 2023 05:17), Max: 36.7 (01 Sep 2023 20:18)  T(F): 98 (02 Sep 2023 05:17), Max: 98 (01 Sep 2023 20:18)  HR: 62 (02 Sep 2023 05:17) (58 - 65)  BP: 109/45 (02 Sep 2023 05:17) (105/58 - 111/62)  BP(mean): --  RR: 16 (02 Sep 2023 05:17) (16 - 18)  SpO2: 96% (02 Sep 2023 05:17) (96% - 100%)    Parameters below as of 02 Sep 2023 05:17  Patient On (Oxygen Delivery Method): room air        GENERAL: NAD, well-developed  HEAD:  Atraumatic, Normocephalic  EYES: EOMI, PERRLA, conjunctiva and sclera clear  NECK: Supple, No JVD  CHEST/LUNG: Clear to auscultation bilaterally; No wheeze  HEART: Regular rate and rhythm; No murmurs, rubs, or gallops  ABDOMEN: Soft, Nontender, Nondistended; Bowel sounds present  EXTREMITIES:  2+ Peripheral Pulses, left hand is wrapped and dry, erythema overlying left 3rd digit   PSYCH: AAOx3  NEUROLOGY: non-focal  SKIN: No rashes or lesions    bacitracin   Ointment 1 Application(s) Topical daily  bictegravir 50 mG/emtricitabine 200 mG/tenofovir alafenamide 25 mG (BIKTARVY) 1 Tablet(s) Oral daily  ceFAZolin   IVPB 2000 milliGRAM(s) IV Intermittent every 8 hours  cholecalciferol 2000 Unit(s) Oral daily  ibuprofen  Tablet. 400 milliGRAM(s) Oral every 8 hours PRN  multivitamin 1 Tablet(s) Oral daily  ondansetron Injectable 4 milliGRAM(s) IV Push every 8 hours PRN                            13.2   5.63  )-----------( 406      ( 01 Sep 2023 06:47 )             41.0       09-01    142  |  110<H>  |  8   ----------------------------<  92  4.0   |  25  |  0.95    Ca    8.9      01 Sep 2023 06:47  Phos  3.0     09-01  Mg     2.1     09-01    TPro  7.6  /  Alb  3.3<L>  /  TBili  0.2  /  DBili  x   /  AST  34  /  ALT  33  /  AlkPhos  65  08-31

## 2023-09-02 NOTE — PROGRESS NOTE ADULT - ASSESSMENT
Patient is a 27 year old male with well controlled HIV infection on antiretroviral therapy and history of IV drug use/polysubstance use who is admitted with MSSA bacteremia. His source was likely his skin/soft tissue infection of the left hand (in addition to obvious concern of IV drug use). Will need MRI of L hand to rule out OM/septic arthritis as well as TTE to eval for infective endocarditis. Suspect patient will leave hospital prior to completion of full IV antimicrobial therapy, but will strongly advise him to stay in house for the duration of extended therapy (will not qualify for outpatient IV given his history of IV drug use.     I have reviewed the available data. Blood culture from 8/31 no growth to date at 24 hours.     #MSSA Bacteremia   #Left Hand Cellulitis, rule out septic arthritis   #HIV Infection (Well controlled)   #Polysubstance Use    #History of IV Drug Use     -follow up Infectious Disease recommendations   -continue IV cefazolin   -follow repeat blood cultures (8/31) for clearance of bacteremia   -TTE (pending)  -MRI L hand with IV contrast (pending)   -prn analgesia for left hand  -follow up wound team recommendations re: left hand wound

## 2023-09-03 PROCEDURE — 99232 SBSQ HOSP IP/OBS MODERATE 35: CPT

## 2023-09-03 PROCEDURE — 73220 MRI UPPR EXTREMITY W/O&W/DYE: CPT | Mod: 26,LT

## 2023-09-03 RX ADMIN — Medication 1 APPLICATION(S): at 11:43

## 2023-09-03 RX ADMIN — BICTEGRAVIR SODIUM, EMTRICITABINE, AND TENOFOVIR ALAFENAMIDE FUMARATE 1 TABLET(S): 30; 120; 15 TABLET ORAL at 11:44

## 2023-09-03 RX ADMIN — Medication 100 MILLIGRAM(S): at 16:04

## 2023-09-03 RX ADMIN — Medication 400 MILLIGRAM(S): at 08:25

## 2023-09-03 RX ADMIN — Medication 2000 UNIT(S): at 11:43

## 2023-09-03 RX ADMIN — Medication 400 MILLIGRAM(S): at 21:35

## 2023-09-03 RX ADMIN — Medication 100 MILLIGRAM(S): at 21:47

## 2023-09-03 RX ADMIN — Medication 400 MILLIGRAM(S): at 08:55

## 2023-09-03 RX ADMIN — Medication 400 MILLIGRAM(S): at 21:05

## 2023-09-03 RX ADMIN — Medication 100 MILLIGRAM(S): at 05:15

## 2023-09-03 NOTE — PROGRESS NOTE ADULT - ASSESSMENT
Patient is a 27 year old male with well controlled HIV infection on antiretroviral therapy and history of IV drug use/polysubstance use who is admitted with MSSA bacteremia. His source was likely his skin/soft tissue infection of the left hand (in addition to obvious concern of IV drug use). Will need MRI of L hand to rule out OM/septic arthritis as well as TTE to eval for infective endocarditis (surface echo negative). Suspect patient will leave hospital prior to completion of full IV antimicrobial therapy, but will strongly advise him to stay in house for the duration of extended therapy (will not qualify for outpatient IV given his history of IV drug use.     I have reviewed the available data. Blood culture from 8/31 no growth to date at 48 hours. Surface TTE has no evidence of vegetation/endocarditis. There are no murmurs or other sequelae on physical exam, but gold standard exclusionary test would be GAYLE    #MSSA Bacteremia (repeat BCX 8/31 negative at 48 hours)  #Left Hand Cellulitis, rule out septic arthritis   #HIV Infection (Well controlled)   #Polysubstance Use    #History of IV Drug Use     -follow up Infectious Disease recommendations   -continue IV cefazolin   -f/u MRI Hand results  -MRI L hand with IV contrast (pending)   -explore if patient will be open to GAYLE early next week after the holiday   -prn analgesia for left hand  -follow up wound team recommendations re: left hand wound

## 2023-09-03 NOTE — PROGRESS NOTE ADULT - SUBJECTIVE AND OBJECTIVE BOX
S: Patient has no new symptoms. He continues to go back and forth about how long he is wiling to stay, but so far has remained in house.     O:  Vital Signs Last 24 Hrs  T(C): 36.6 (03 Sep 2023 05:25), Max: 36.7 (02 Sep 2023 13:10)  T(F): 97.9 (03 Sep 2023 05:25), Max: 98.1 (02 Sep 2023 13:10)  HR: 90 (03 Sep 2023 05:25) (70 - 90)  BP: 108/57 (03 Sep 2023 05:25) (108/57 - 111/67)  BP(mean): 82 (02 Sep 2023 20:27) (82 - 82)  RR: 18 (03 Sep 2023 05:25) (18 - 18)  SpO2: 98% (03 Sep 2023 05:25) (98% - 100%)    Parameters below as of 03 Sep 2023 05:25  Patient On (Oxygen Delivery Method): room air        GENERAL: NAD, well-developed  HEAD:  Atraumatic, Normocephalic  EYES: EOMI, PERRLA, conjunctiva and sclera clear  NECK: Supple, No JVD  CHEST/LUNG: Clear to auscultation bilaterally; No wheeze  HEART: Regular rate and rhythm; No murmurs, rubs, or gallops  ABDOMEN: Soft, Nontender, Nondistended; Bowel sounds present  EXTREMITIES:  2+ Peripheral Pulses; left hand wrapped with gauze clean and dry   PSYCH: AAOx3  NEUROLOGY: non-focal  SKIN: No rashes or lesions    bacitracin   Ointment 1 Application(s) Topical daily  bictegravir 50 mG/emtricitabine 200 mG/tenofovir alafenamide 25 mG (BIKTARVY) 1 Tablet(s) Oral daily  ceFAZolin   IVPB 2000 milliGRAM(s) IV Intermittent every 8 hours  cholecalciferol 2000 Unit(s) Oral daily  ibuprofen  Tablet. 400 milliGRAM(s) Oral every 8 hours PRN  multivitamin 1 Tablet(s) Oral daily  ondansetron Injectable 4 milliGRAM(s) IV Push every 8 hours PRN

## 2023-09-04 LAB
ANION GAP SERPL CALC-SCNC: 6 MMOL/L — SIGNIFICANT CHANGE UP (ref 5–17)
BUN SERPL-MCNC: 11 MG/DL — SIGNIFICANT CHANGE UP (ref 7–18)
CALCIUM SERPL-MCNC: 8.3 MG/DL — LOW (ref 8.4–10.5)
CHLORIDE SERPL-SCNC: 109 MMOL/L — HIGH (ref 96–108)
CO2 SERPL-SCNC: 25 MMOL/L — SIGNIFICANT CHANGE UP (ref 22–31)
CREAT SERPL-MCNC: 0.91 MG/DL — SIGNIFICANT CHANGE UP (ref 0.5–1.3)
EGFR: 117 ML/MIN/1.73M2 — SIGNIFICANT CHANGE UP
GLUCOSE SERPL-MCNC: 102 MG/DL — HIGH (ref 70–99)
HCT VFR BLD CALC: 40.9 % — SIGNIFICANT CHANGE UP (ref 39–50)
HGB BLD-MCNC: 13.4 G/DL — SIGNIFICANT CHANGE UP (ref 13–17)
MCHC RBC-ENTMCNC: 30.9 PG — SIGNIFICANT CHANGE UP (ref 27–34)
MCHC RBC-ENTMCNC: 32.8 GM/DL — SIGNIFICANT CHANGE UP (ref 32–36)
MCV RBC AUTO: 94.2 FL — SIGNIFICANT CHANGE UP (ref 80–100)
NRBC # BLD: 0 /100 WBCS — SIGNIFICANT CHANGE UP (ref 0–0)
PLATELET # BLD AUTO: 450 K/UL — HIGH (ref 150–400)
POTASSIUM SERPL-MCNC: 3.7 MMOL/L — SIGNIFICANT CHANGE UP (ref 3.5–5.3)
POTASSIUM SERPL-SCNC: 3.7 MMOL/L — SIGNIFICANT CHANGE UP (ref 3.5–5.3)
RBC # BLD: 4.34 M/UL — SIGNIFICANT CHANGE UP (ref 4.2–5.8)
RBC # FLD: 12.8 % — SIGNIFICANT CHANGE UP (ref 10.3–14.5)
SODIUM SERPL-SCNC: 140 MMOL/L — SIGNIFICANT CHANGE UP (ref 135–145)
WBC # BLD: 8.16 K/UL — SIGNIFICANT CHANGE UP (ref 3.8–10.5)
WBC # FLD AUTO: 8.16 K/UL — SIGNIFICANT CHANGE UP (ref 3.8–10.5)

## 2023-09-04 PROCEDURE — 99233 SBSQ HOSP IP/OBS HIGH 50: CPT

## 2023-09-04 PROCEDURE — 99232 SBSQ HOSP IP/OBS MODERATE 35: CPT

## 2023-09-04 RX ADMIN — BICTEGRAVIR SODIUM, EMTRICITABINE, AND TENOFOVIR ALAFENAMIDE FUMARATE 1 TABLET(S): 30; 120; 15 TABLET ORAL at 11:23

## 2023-09-04 RX ADMIN — Medication 400 MILLIGRAM(S): at 05:19

## 2023-09-04 RX ADMIN — Medication 400 MILLIGRAM(S): at 05:49

## 2023-09-04 RX ADMIN — Medication 1 APPLICATION(S): at 10:34

## 2023-09-04 RX ADMIN — Medication 100 MILLIGRAM(S): at 21:14

## 2023-09-04 RX ADMIN — Medication 100 MILLIGRAM(S): at 14:38

## 2023-09-04 RX ADMIN — Medication 2000 UNIT(S): at 11:24

## 2023-09-04 RX ADMIN — Medication 100 MILLIGRAM(S): at 05:19

## 2023-09-04 NOTE — PROGRESS NOTE ADULT - SUBJECTIVE AND OBJECTIVE BOX
S: Patient has no new symptoms. Took history while he was standing up and walking around in the hallway. He still wants to leave as soon as possible; reiterated that recommended course of care is extended IV treatment    O:  Vital Signs Last 24 Hrs  T(C): 36.4 (04 Sep 2023 05:05), Max: 36.8 (03 Sep 2023 14:41)  T(F): 97.6 (04 Sep 2023 05:05), Max: 98.2 (03 Sep 2023 14:41)  HR: 73 (04 Sep 2023 05:05) (73 - 97)  BP: 114/68 (04 Sep 2023 05:05) (106/59 - 126/69)  BP(mean): --  RR: 18 (04 Sep 2023 05:05) (17 - 18)  SpO2: 99% (04 Sep 2023 05:05) (98% - 100%)    Parameters below as of 04 Sep 2023 05:05  Patient On (Oxygen Delivery Method): room air        GENERAL: NAD, well-developed  HEAD:  Atraumatic, Normocephalic  EYES: EOMI, PERRLA, conjunctiva and sclera clear  NECK: Supple, No JVD  CHEST/LUNG: Clear to auscultation bilaterally; No wheeze  HEART: Regular rate and rhythm; No murmurs, rubs, or gallops  ABDOMEN: Soft, Nontender, Nondistended; Bowel sounds present  EXTREMITIES:  2+ Peripheral Pulses,  left 3rd digit wrapped with clean gauze   PSYCH: AAOx3  NEUROLOGY: non-focal  SKIN: No rashes or lesions    bacitracin   Ointment 1 Application(s) Topical daily  bictegravir 50 mG/emtricitabine 200 mG/tenofovir alafenamide 25 mG (BIKTARVY) 1 Tablet(s) Oral daily  ceFAZolin   IVPB 2000 milliGRAM(s) IV Intermittent every 8 hours  cholecalciferol 2000 Unit(s) Oral daily  ibuprofen  Tablet. 400 milliGRAM(s) Oral every 8 hours PRN  multivitamin 1 Tablet(s) Oral daily  ondansetron Injectable 4 milliGRAM(s) IV Push every 8 hours PRN                            13.4   8.16  )-----------( 450      ( 04 Sep 2023 06:55 )             40.9       09-04    140  |  109<H>  |  11  ----------------------------<  102<H>  3.7   |  25  |  0.91    Ca    8.3<L>      04 Sep 2023 06:55

## 2023-09-04 NOTE — PROGRESS NOTE ADULT - ASSESSMENT
Patient is a 27 year old male with well controlled HIV infection on antiretroviral therapy and history of IV drug use/polysubstance use who is admitted with MSSA bacteremia. His source was likely his skin/soft tissue infection of the left hand (in addition to obvious concern of IV drug use). Will need MRI of L hand to rule out OM/septic arthritis as well as TTE to eval for infective endocarditis (surface echo negative). Suspect patient will leave hospital prior to completion of full IV antimicrobial therapy, but will strongly advise him to stay in house for the duration of extended therapy (will not qualify for outpatient IV given his history of IV drug use.     I have reviewed the available data. Blood culture from 8/31 no growth to date at 72 hours. Surface TTE has no evidence of vegetation/endocarditis. There are no murmurs or other sequelae on physical exam, but gold standard exclusionary test would be GAYLE. MRI pending. I have reviewed BMP and CBC. Renal function and electrolytes within normal limits. No leukocytosis, Hgb stable.     #MSSA Bacteremia (repeat BCX 8/31 negative at 48 hours)  #Left Hand Cellulitis, rule out septic arthritis   #HIV Infection (Well controlled)   #Polysubstance Use    #History of IV Drug Use     -follow up Infectious Disease recommendations   -continue IV cefazolin, likely 4 to 6 weeks total duration   -f/u MRI Hand results  -MRI L hand with IV contrast (pending)   -explore if patient will be open to [facility] early next week after the holiday   -patient reportedly has scheduled rehab program starting 9/5  -prn analgesia for left hand  -follow up wound team recommendations re: left hand wound

## 2023-09-04 NOTE — PROGRESS NOTE ADULT - ASSESSMENT
Subjective:    MEDS:  bacitracin   Ointment 1 Application(s) Topical daily  cholecalciferol 2000 Unit(s) Oral daily  ibuprofen  Tablet. 400 milliGRAM(s) Oral every 8 hours PRN  multivitamin 1 Tablet(s) Oral daily  ondansetron Injectable 4 milliGRAM(s) IV Push every 8 hours PRN      REVIEW OF SYSTEMS:  CONSTITUTIONAL:  No fevers or chills  EYES/ENT:  No visual changes;  No vertigo or throat pain   NECK:  No pain or stiffness  RESPIRATORY:  No cough, wheezing, hemoptysis; No shortness of breath  CARDIOVASCULAR:  No chest pain or palpitations  GASTROINTESTINAL:  No abdominal or epigastric pain. No nausea, vomiting, or hematemesis; No diarrhea or constipation. No melena or hematochezia.  GENITOURINARY:  No dysuria, frequency or hematuria  NEUROLOGICAL:  No numbness or weakness  MSK: no back pain, no joint pain  SKIN:  No itching, rashes    PE:  Vital Signs Last 24 Hrs  T(C): 36.4 (04 Sep 2023 05:05), Max: 36.8 (03 Sep 2023 14:41)  T(F): 97.6 (04 Sep 2023 05:05), Max: 98.2 (03 Sep 2023 14:41)  HR: 73 (04 Sep 2023 05:05) (73 - 97)  BP: 114/68 (04 Sep 2023 05:05) (106/59 - 126/69)  BP(mean): --  RR: 18 (04 Sep 2023 05:05) (17 - 18)  SpO2: 99% (04 Sep 2023 05:05) (98% - 100%)    Parameters below as of 04 Sep 2023 05:05  Patient On (Oxygen Delivery Method): room air      Gen: AOx3, NAD, non-toxic, pleasant  HEAD:  Atraumatic, Normocephalic  EYES: PERRLA, conjunctiva and sclera clear  ENT: Moist mucous membranes  NECK: Supple, No JVD  CV: S1+S2 normal, nontachycardic  Resp: Clear bilat, no resp distress, no crackles/wheezes  Abd: Soft, nontender, +BS  Ext: No LE edema, no cyanosis, pulses +  : No Conley  IV/Skin: No thrombophlebitis  Msk: No low back pain, no arthralgias, no joint swelling  Neuro: AAOx3. No sensory deficits, no motor deficits  Psych: no anxiety, no delusional ideas, no suicidal ideation    LABS:                        13.4   8.16  )-----------( 450      ( 04 Sep 2023 06:55 )             40.9     09-04    140  |  109<H>  |  11  ----------------------------<  102<H>  3.7   |  25  |  0.91    Ca    8.3<L>      04 Sep 2023 06:55      Urinalysis Basic - ( 04 Sep 2023 06:55 )    Color: x / Appearance: x / SG: x / pH: x  Gluc: 102 mg/dL / Ketone: x  / Bili: x / Urobili: x   Blood: x / Protein: x / Nitrite: x   Leuk Esterase: x / RBC: x / WBC x   Sq Epi: x / Non Sq Epi: x / Bacteria: x        MICROBIOLOGY:  v    .Blood Blood  08-31-23   No growth at 72 Hours  --  --      .Blood Blood-Venous  08-28-23   No growth at 5 days  --  --      .Blood Blood-Peripheral  08-28-23   Growth in anaerobic bottle: Staphylococcus aureus  Direct identification is available within approximately 3-5  hours either by Blood Panel Multiplexed PCR or Direct  MALDI-TOF. Details: [email or web address]  --  Blood Culture PCR  Staphylococcus aureus    HIV-1 RNA Quantitative, Viral Load Log: NOT DET. lg /mL (08-29-23 @ 10:30)    RADIOLOGY:    ANTIBIOTICS:  bictegravir 50 mG/emtricitabine 200 mG/tenofovir alafenamide 25 mG (BIKTARVY) 1 daily  ceFAZolin   IVPB 2000 every 8 hours      IMPRESSION:    PLAN:    Please reach ID with any questions or concerns  Dr. Shell Capone  Available in Teams     Subjective: NAD, afebrile, no N/V or diarrhea, L hand pain controlled.    MEDS:  bacitracin   Ointment 1 Application(s) Topical daily  cholecalciferol 2000 Unit(s) Oral daily  ibuprofen  Tablet. 400 milliGRAM(s) Oral every 8 hours PRN  multivitamin 1 Tablet(s) Oral daily  ondansetron Injectable 4 milliGRAM(s) IV Push every 8 hours PRN      REVIEW OF SYSTEMS:  CONSTITUTIONAL:  No fevers or chills  EYES/ENT:  No visual changes;  No vertigo or throat pain   NECK:  No pain or stiffness  RESPIRATORY:  No cough, wheezing, hemoptysis; No shortness of breath  CARDIOVASCULAR:  No chest pain or palpitations  GASTROINTESTINAL:  No abdominal or epigastric pain. No nausea, vomiting, or hematemesis; No diarrhea or constipation. No melena or hematochezia.  GENITOURINARY:  No dysuria, frequency or hematuria  NEUROLOGICAL:  No numbness or weakness  MSK: no back pain, no joint pain  SKIN:  No itching, rashes    PE:  Vital Signs Last 24 Hrs  T(C): 36.4 (04 Sep 2023 05:05), Max: 36.8 (03 Sep 2023 14:41)  T(F): 97.6 (04 Sep 2023 05:05), Max: 98.2 (03 Sep 2023 14:41)  HR: 73 (04 Sep 2023 05:05) (73 - 97)  BP: 114/68 (04 Sep 2023 05:05) (106/59 - 126/69)  RR: 18 (04 Sep 2023 05:05) (17 - 18)  SpO2: 99% (04 Sep 2023 05:05) (98% - 100%)    Parameters below as of 04 Sep 2023 05:05  Patient On (Oxygen Delivery Method): room air    Gen: AOx3, NAD, non-toxic, pleasant  HEAD:  Atraumatic, Normocephalic  EYES: PERRLA, conjunctiva and sclera clear  ENT: Moist mucous membranes  NECK: Supple, No JVD  CV: S1+S2 normal, nontachycardic  Resp: Clear bilat, no resp distress, no crackles/wheezes  Abd: Soft, nontender, +BS  Ext: No LE edema, no cyanosis, pulses +  : No Conley, no dysuria  IV/Skin: No thrombophlebitis  Msk: No low back pain  L 3rd digit withsmall wound on dorsal proximal MTP with some yellow fibrinous tissue , surrounding erythema and swelling improving, still decreased ROM.   Neuro: AAOx3. No sensory deficits, no motor deficits  Psych: no anxiety, no delusional ideas, no suicidal ideation    LABS:                        13.4   8.16  )-----------( 450      ( 04 Sep 2023 06:55 )             40.9     09-04    140  |  109<H>  |  11  ----------------------------<  102<H>  3.7   |  25  |  0.91    Ca    8.3<L>      04 Sep 2023 06:55    MICROBIOLOGY:  v    .Blood Blood  08-31-23   No growth at 72 Hours  --  --      .Blood Blood-Venous  08-28-23   No growth at 5 days  --  --      .Blood Blood-Peripheral  08-28-23   Growth in anaerobic bottle: Staphylococcus aureus  Direct identification is available within approximately 3-5  hours either by Blood Panel Multiplexed PCR or Direct  MALDI-TOF. Details: [email or web address]  --  Blood Culture PCR  Staphylococcus aureus    HIV-1 RNA Quantitative, Viral Load Log: NOT DET. lg /mL (08-29-23 @ 10:30)    RADIOLOGY:MRI L hand done, pending report    ANTIBIOTICS:  bictegravir 50 mG/emtricitabine 200 mG/tenofovir alafenamide 25 mG (BIKTARVY) 1 daily  ceFAZolin   IVPB 2000 every 8 hours    Procalcitonin, Serum: 0.10 ng/mL (08-27-23 @ 23:10)  Procalcitonin, Serum: 0.09 ng/mL (08-31-23 @ 17:17)  Sedimentation Rate, Erythrocyte: 16 mm/Hr *H* (08-31-23 @ 17:17)  C-Reactive Protein, Serum: 11 mg/L *H* (08-31-23 @ 17:17)    IMPRESSION:  26 yo male with history of PSA w/crystal meth IVDU, Depression with previous suicidal attempt,  HIV + on Biktarvy CD4 367 and VL undetectable, presented to [facility] of [facility] on 8/28 with complains of L hand swelling, reporting metal ring device encased around his L hand unclear if due to attempt of drug skin popping. At the time he was actively using drugs. Metal device around his hand removed(with difficulty). ID consulted for L hand cellulitis, was started on IV vancomycin on previous admission. Pt d/c on clindamycin for cellulitis, later called back due to bacteremia (MSSA on BC 8/28).    -BSI - MSSA source skin infection(L hand cellulitis +/- IVDU). Blood cultures 8/31 NGTD. TTE with no vegetations.   -PSA- Opioids and amphetamines   -L hand cellulitis (after traumatic injury unclear if also skin popping of drugs)  -HIV infection on Biktarvy, undetectable     PLAN:  Continue Ancef 2g q8 hrs IV  Continue Biktarvy 1 tab daily  PO  Trend esr and crp  Follow up L hand MRI  TTE reviewed with no vegetations, please obtain GAYLE  Wound care  Discussed with pt at length about the importance of complete course of IV abx for MSSA BSI, also about drugs detox.   Discussed with Dr. Moreira     Please reach ID with any questions or concerns  Dr. Shell Capone  Available in Teams

## 2023-09-05 DIAGNOSIS — M86.9 OSTEOMYELITIS, UNSPECIFIED: ICD-10-CM

## 2023-09-05 LAB
ANION GAP SERPL CALC-SCNC: 6 MMOL/L — SIGNIFICANT CHANGE UP (ref 5–17)
BASOPHILS # BLD AUTO: 0.06 K/UL — SIGNIFICANT CHANGE UP (ref 0–0.2)
BASOPHILS NFR BLD AUTO: 0.8 % — SIGNIFICANT CHANGE UP (ref 0–2)
BUN SERPL-MCNC: 9 MG/DL — SIGNIFICANT CHANGE UP (ref 7–18)
CALCIUM SERPL-MCNC: 9.6 MG/DL — SIGNIFICANT CHANGE UP (ref 8.4–10.5)
CHLORIDE SERPL-SCNC: 104 MMOL/L — SIGNIFICANT CHANGE UP (ref 96–108)
CO2 SERPL-SCNC: 30 MMOL/L — SIGNIFICANT CHANGE UP (ref 22–31)
CREAT SERPL-MCNC: 1 MG/DL — SIGNIFICANT CHANGE UP (ref 0.5–1.3)
CRP SERPL-MCNC: <3 MG/L — SIGNIFICANT CHANGE UP
CULTURE RESULTS: SIGNIFICANT CHANGE UP
CULTURE RESULTS: SIGNIFICANT CHANGE UP
EGFR: 104 ML/MIN/1.73M2 — SIGNIFICANT CHANGE UP
EOSINOPHIL # BLD AUTO: 0.24 K/UL — SIGNIFICANT CHANGE UP (ref 0–0.5)
EOSINOPHIL NFR BLD AUTO: 3.1 % — SIGNIFICANT CHANGE UP (ref 0–6)
ERYTHROCYTE [SEDIMENTATION RATE] IN BLOOD: 5 MM/HR — SIGNIFICANT CHANGE UP (ref 0–15)
GLUCOSE SERPL-MCNC: 98 MG/DL — SIGNIFICANT CHANGE UP (ref 70–99)
HCT VFR BLD CALC: 42.9 % — SIGNIFICANT CHANGE UP (ref 39–50)
HGB BLD-MCNC: 14 G/DL — SIGNIFICANT CHANGE UP (ref 13–17)
IMM GRANULOCYTES NFR BLD AUTO: 1.7 % — HIGH (ref 0–0.9)
LYMPHOCYTES # BLD AUTO: 2.56 K/UL — SIGNIFICANT CHANGE UP (ref 1–3.3)
LYMPHOCYTES # BLD AUTO: 33.4 % — SIGNIFICANT CHANGE UP (ref 13–44)
MCHC RBC-ENTMCNC: 30.6 PG — SIGNIFICANT CHANGE UP (ref 27–34)
MCHC RBC-ENTMCNC: 32.6 GM/DL — SIGNIFICANT CHANGE UP (ref 32–36)
MCV RBC AUTO: 93.9 FL — SIGNIFICANT CHANGE UP (ref 80–100)
MONOCYTES # BLD AUTO: 0.55 K/UL — SIGNIFICANT CHANGE UP (ref 0–0.9)
MONOCYTES NFR BLD AUTO: 7.2 % — SIGNIFICANT CHANGE UP (ref 2–14)
NEUTROPHILS # BLD AUTO: 4.13 K/UL — SIGNIFICANT CHANGE UP (ref 1.8–7.4)
NEUTROPHILS NFR BLD AUTO: 53.8 % — SIGNIFICANT CHANGE UP (ref 43–77)
NRBC # BLD: 0 /100 WBCS — SIGNIFICANT CHANGE UP (ref 0–0)
PLATELET # BLD AUTO: 471 K/UL — HIGH (ref 150–400)
POTASSIUM SERPL-MCNC: 3.9 MMOL/L — SIGNIFICANT CHANGE UP (ref 3.5–5.3)
POTASSIUM SERPL-SCNC: 3.9 MMOL/L — SIGNIFICANT CHANGE UP (ref 3.5–5.3)
RBC # BLD: 4.57 M/UL — SIGNIFICANT CHANGE UP (ref 4.2–5.8)
RBC # FLD: 13.1 % — SIGNIFICANT CHANGE UP (ref 10.3–14.5)
SODIUM SERPL-SCNC: 140 MMOL/L — SIGNIFICANT CHANGE UP (ref 135–145)
SPECIMEN SOURCE: SIGNIFICANT CHANGE UP
SPECIMEN SOURCE: SIGNIFICANT CHANGE UP
WBC # BLD: 7.67 K/UL — SIGNIFICANT CHANGE UP (ref 3.8–10.5)
WBC # FLD AUTO: 7.67 K/UL — SIGNIFICANT CHANGE UP (ref 3.8–10.5)

## 2023-09-05 PROCEDURE — 99232 SBSQ HOSP IP/OBS MODERATE 35: CPT

## 2023-09-05 RX ORDER — LANOLIN ALCOHOL/MO/W.PET/CERES
5 CREAM (GRAM) TOPICAL ONCE
Refills: 0 | Status: COMPLETED | OUTPATIENT
Start: 2023-09-05 | End: 2023-09-05

## 2023-09-05 RX ADMIN — Medication 2000 UNIT(S): at 12:59

## 2023-09-05 RX ADMIN — Medication 1 APPLICATION(S): at 12:58

## 2023-09-05 RX ADMIN — Medication 100 MILLIGRAM(S): at 21:41

## 2023-09-05 RX ADMIN — Medication 100 MILLIGRAM(S): at 05:12

## 2023-09-05 RX ADMIN — BICTEGRAVIR SODIUM, EMTRICITABINE, AND TENOFOVIR ALAFENAMIDE FUMARATE 1 TABLET(S): 30; 120; 15 TABLET ORAL at 12:57

## 2023-09-05 RX ADMIN — Medication 100 MILLIGRAM(S): at 14:59

## 2023-09-05 RX ADMIN — Medication 5 MILLIGRAM(S): at 23:25

## 2023-09-05 NOTE — PROGRESS NOTE ADULT - SUBJECTIVE AND OBJECTIVE BOX
Patient is a 27y old  Male who presents with a chief complaint of MSSA bacteremia (04 Sep 2023 12:34)      INTERVAL HPI/OVERNIGHT EVENTS: no acute events overnight       REVIEW OF SYSTEMS:  CONSTITUTIONAL: No fever, chills  ENMT:  No difficulty hearing, no change in vision  NECK: No pain or stiffness  RESPIRATORY: No cough, SOB  CARDIOVASCULAR: No chest pain, palpitations  GASTROINTESTINAL: No abdominal pain. No nausea, vomiting, or diarrhea  GENITOURINARY: No dysuria  NEUROLOGICAL: No HA  SKIN: No itching, burning, rashes, or lesions   LYMPH NODES: No enlarged glands  ENDOCRINE: No heat or cold intolerance; No hair loss  MUSCULOSKELETAL: No joint pain or swelling; No muscle, back, or extremity pain  PSYCHIATRIC: No depression, anxiety  HEME/LYMPH: No easy bruising, or bleeding gums    T(C): 36.5 (09-05-23 @ 04:35), Max: 36.8 (09-04-23 @ 20:12)  HR: 86 (09-05-23 @ 04:35) (86 - 95)  BP: 107/66 (09-05-23 @ 04:35) (107/66 - 111/65)  RR: 18 (09-05-23 @ 04:35) (17 - 18)  SpO2: 100% (09-05-23 @ 04:35) (100% - 100%)  Wt(kg): --Vital Signs Last 24 Hrs  T(C): 36.5 (05 Sep 2023 04:35), Max: 36.8 (04 Sep 2023 20:12)  T(F): 97.7 (05 Sep 2023 04:35), Max: 98.3 (04 Sep 2023 20:12)  HR: 86 (05 Sep 2023 04:35) (86 - 95)  BP: 107/66 (05 Sep 2023 04:35) (107/66 - 111/65)  BP(mean): --  RR: 18 (05 Sep 2023 04:35) (17 - 18)  SpO2: 100% (05 Sep 2023 04:35) (100% - 100%)    Parameters below as of 05 Sep 2023 04:35  Patient On (Oxygen Delivery Method): room air    MEDICATIONS  (STANDING):  bacitracin   Ointment 1 Application(s) Topical daily  bictegravir 50 mG/emtricitabine 200 mG/tenofovir alafenamide 25 mG (BIKTARVY) 1 Tablet(s) Oral daily  ceFAZolin   IVPB 2000 milliGRAM(s) IV Intermittent every 8 hours  cholecalciferol 2000 Unit(s) Oral daily  multivitamin 1 Tablet(s) Oral daily    MEDICATIONS  (PRN):  ibuprofen  Tablet. 400 milliGRAM(s) Oral every 8 hours PRN Temp greater or equal to 38C (100.4F), Mild Pain (1 - 3)  ondansetron Injectable 4 milliGRAM(s) IV Push every 8 hours PRN Nausea and/or Vomiting      PHYSICAL EXAM:  GENERAL: NAD  EYES: clear conjunctiva; EOMI  ENMT: Moist mucous membranes  NECK: Supple, No JVD, Normal thyroid  CHEST/LUNG: Clear to auscultation bilaterally; No rales, rhonchi, wheezing, or rubs  HEART: S1, S2, Regular rate and rhythm  ABDOMEN: Soft, Nontender, Nondistended; Bowel sounds present  NEURO: Alert & Oriented X3  EXTREMITIES: No LE edema, no calf tenderness, L hand swelling   LYMPH: No lymphadenopathy noted  SKIN: No rashes or lesions    Consultant(s) Notes Reviewed:  [x ] YES  [ ] NO  Care Discussed with Consultants/Other Providers [ x] YES  [ ] NO    LABS:                        14.0   7.67  )-----------( 471      ( 05 Sep 2023 07:31 )             42.9     09-05    140  |  104  |  9   ----------------------------<  98  3.9   |  30  |  1.00    Ca    9.6      05 Sep 2023 07:31        CAPILLARY BLOOD GLUCOSE    Urinalysis Basic - ( 05 Sep 2023 07:31 )    Color: x / Appearance: x / SG: x / pH: x  Gluc: 98 mg/dL / Ketone: x  / Bili: x / Urobili: x   Blood: x / Protein: x / Nitrite: x   Leuk Esterase: x / RBC: x / WBC x   Sq Epi: x / Non Sq Epi: x / Bacteria: x      RADIOLOGY & ADDITIONAL TESTS:    Imaging Personally Reviewed:  [ x] YES  [ ] NO  < from: MR Johnson w/wo IV Cont, Left (09.03.23 @ 19:56) >    ACC: [id number] EXAM:  MR JOHNSON WAW IC LT   ORDERED BY: CRIS DAVIS     PROCEDURE DATE:  09/03/2023          INTERPRETATION:  EXAMINATION: MRI of the left hand with and without   contrast    CLINICAL INFORMATION: Bacteremia    TECHNIQUE: Multiplanar,multisequential MR imaging was performed. This   includes T1-weighted images after the administration of 7.5 mL of   intravenous Gadavist.    FINDINGS: There is a wound along the dorsal aspect of the midshaft of the   proximal phalanx of the long finger which appears to extend through the   extensor mechanism. There is adjacent subcutaneous fat infiltration and   enhancement, consistent with cellulitis. There is some high STIR signal   throughout the proximal phalanx, suspicious for early osteomyelitis.    There is no fracture. There are no joint effusions or advanced arthritic   changes.    IMPRESSION: Wound along the dorsal aspect of the long finger proximal   phalanx with adjacent cellulitis. Signal alteration within the long   finger proximal phalanx, suspicious for early osteomyelitis.    --- End of Report ---            KELI LEDESMA MD; Attending Radiologist  This document has been electronically signed. Sep  4 2023  4:17PM    < end of copied text >

## 2023-09-05 NOTE — PROGRESS NOTE ADULT - ASSESSMENT
28 yo M w crystal meth IV drug use, (w hx of rhabdo in 2016 from crystal meth use), HIV+ on Biktarvy (viral load undetectable 8/2023), with hx of suicide attempt and unspecified hx of psychosis/hallucinations. Pt was admitted to [facility] on 8/28 and left AMA on 8/29, after intoxicated with meth and placed a metal ring or device on his L middle and L ring fingers 2 days prior to ED presentation. Pt required procedural sedation w ketamine, ativan, and pain relief w morphine to facilitate removal of the device w lilli by ERENDIRA. He was given IV abx and then dc with Clindamycin PO when he left AMA. Blood cultures came back pos for MSSA, so pt was called to come back to ED.   Admitted for MSSA bacteremia, started on Cefazolin, ID Dr. Tavares consulted, repeat cultures negative, TTE no endocarditis. Pending GAYLE

## 2023-09-05 NOTE — PROGRESS NOTE ADULT - NS ATTEND AMEND GEN_ALL_CORE FT
Patient seen at bedside, states he feels good. Denies bleeding or exudate from the L finger wound.    On exam, patient is NAD, cardiopulmonary exams unremarkable, no murmur noted. Abdomen NT/ND. Extremities without C/C/E.   CBC, BMP reviewed, both unchanged and stable. CRP and ESR both negative.  Repeat BCx 8/31 negative.        Assessment and plan   Patient is a 27 year old male with well controlled HIV infection on antiretroviral therapy and history of IV drug use/polysubstance use who is admitted with MSSA bacteremia. His source was likely his skin/soft tissue infection of the left hand (in addition to obvious concern of IV drug use). MRI of L hand shows signs of possible OM. TTE negative for vegetations, now proceeding to GAYLE.      #MSSA Bacteremia (repeat BCX 8/31 negative at 48 hours)   #Left Hand Cellulitis, rule out septic arthritis    #HIV Infection (Well controlled)    #Polysubstance Use     #History of IV Drug Use    -follow up Infectious Disease recommendations    -continue IV cefazolin   -GAYLE planned 9/6, NPO after midnight  -patient reportedly has scheduled rehab program starting 9/5   -prn analgesia for left hand   -follow up wound team recommendations re: left hand wound

## 2023-09-06 LAB
ANION GAP SERPL CALC-SCNC: 6 MMOL/L — SIGNIFICANT CHANGE UP (ref 5–17)
BUN SERPL-MCNC: 16 MG/DL — SIGNIFICANT CHANGE UP (ref 7–18)
CALCIUM SERPL-MCNC: 8.7 MG/DL — SIGNIFICANT CHANGE UP (ref 8.4–10.5)
CHLORIDE SERPL-SCNC: 104 MMOL/L — SIGNIFICANT CHANGE UP (ref 96–108)
CO2 SERPL-SCNC: 27 MMOL/L — SIGNIFICANT CHANGE UP (ref 22–31)
CREAT SERPL-MCNC: 0.98 MG/DL — SIGNIFICANT CHANGE UP (ref 0.5–1.3)
EGFR: 107 ML/MIN/1.73M2 — SIGNIFICANT CHANGE UP
GLUCOSE SERPL-MCNC: 96 MG/DL — SIGNIFICANT CHANGE UP (ref 70–99)
HCT VFR BLD CALC: 42.1 % — SIGNIFICANT CHANGE UP (ref 39–50)
HGB BLD-MCNC: 13.7 G/DL — SIGNIFICANT CHANGE UP (ref 13–17)
MCHC RBC-ENTMCNC: 30.9 PG — SIGNIFICANT CHANGE UP (ref 27–34)
MCHC RBC-ENTMCNC: 32.5 GM/DL — SIGNIFICANT CHANGE UP (ref 32–36)
MCV RBC AUTO: 94.8 FL — SIGNIFICANT CHANGE UP (ref 80–100)
NRBC # BLD: 0 /100 WBCS — SIGNIFICANT CHANGE UP (ref 0–0)
PLATELET # BLD AUTO: 475 K/UL — HIGH (ref 150–400)
POTASSIUM SERPL-MCNC: 3.6 MMOL/L — SIGNIFICANT CHANGE UP (ref 3.5–5.3)
POTASSIUM SERPL-SCNC: 3.6 MMOL/L — SIGNIFICANT CHANGE UP (ref 3.5–5.3)
RBC # BLD: 4.44 M/UL — SIGNIFICANT CHANGE UP (ref 4.2–5.8)
RBC # FLD: 12.9 % — SIGNIFICANT CHANGE UP (ref 10.3–14.5)
SODIUM SERPL-SCNC: 137 MMOL/L — SIGNIFICANT CHANGE UP (ref 135–145)
WBC # BLD: 8.44 K/UL — SIGNIFICANT CHANGE UP (ref 3.8–10.5)
WBC # FLD AUTO: 8.44 K/UL — SIGNIFICANT CHANGE UP (ref 3.8–10.5)

## 2023-09-06 PROCEDURE — 93312 ECHO TRANSESOPHAGEAL: CPT | Mod: 26

## 2023-09-06 PROCEDURE — 93325 DOPPLER ECHO COLOR FLOW MAPG: CPT | Mod: 26

## 2023-09-06 PROCEDURE — 99232 SBSQ HOSP IP/OBS MODERATE 35: CPT

## 2023-09-06 PROCEDURE — 93320 DOPPLER ECHO COMPLETE: CPT | Mod: 26

## 2023-09-06 RX ORDER — CHLORHEXIDINE GLUCONATE 213 G/1000ML
1 SOLUTION TOPICAL
Refills: 0 | Status: DISCONTINUED | OUTPATIENT
Start: 2023-09-06 | End: 2023-09-10

## 2023-09-06 RX ADMIN — Medication 400 MILLIGRAM(S): at 15:40

## 2023-09-06 RX ADMIN — Medication 100 MILLIGRAM(S): at 05:12

## 2023-09-06 RX ADMIN — BICTEGRAVIR SODIUM, EMTRICITABINE, AND TENOFOVIR ALAFENAMIDE FUMARATE 1 TABLET(S): 30; 120; 15 TABLET ORAL at 15:17

## 2023-09-06 RX ADMIN — CHLORHEXIDINE GLUCONATE 1 APPLICATION(S): 213 SOLUTION TOPICAL at 15:21

## 2023-09-06 RX ADMIN — Medication 2000 UNIT(S): at 15:20

## 2023-09-06 RX ADMIN — Medication 400 MILLIGRAM(S): at 15:25

## 2023-09-06 RX ADMIN — Medication 1 APPLICATION(S): at 15:20

## 2023-09-06 RX ADMIN — Medication 100 MILLIGRAM(S): at 22:09

## 2023-09-06 RX ADMIN — Medication 100 MILLIGRAM(S): at 15:20

## 2023-09-06 NOTE — PROGRESS NOTE ADULT - TIME BILLING
- Review of records, vital signs and daily labs, microbiology and other Infectious Diseases related work up  - General physical examination performed  - Generation of Infectious Diseases treatment plan discussed with attending Physician  - Coordination of care with the multidisciplinary team  -Counseling of the patient and/or family members
- Review of records, vital signs and daily labs, microbiology and other Infectious Diseases related work up  - General physical examination performed  - Generation of Infectious Diseases treatment plan discussed with attending Physician  - Coordination of care with the multidisciplinary team  -Counseling of the patient and/or family members
-interview and physical exam  -counseling the patient about his condition and advising to stay in house x 2  -chart review and clinical decision making  -discussion of case with Infectious Disease attending  -review of prior notes and DC summaries  -formulation of clinical plan

## 2023-09-06 NOTE — PROGRESS NOTE ADULT - ASSESSMENT
Subjective: NAD, afebrile, no N/V or diarrhea, no new symptoms    REVIEW OF SYSTEMS:  CONSTITUTIONAL:  No fevers or chills  EYES/ENT:  No visual changes;  No vertigo or throat pain   NECK:  No pain or stiffness  RESPIRATORY:  No cough, wheezing, hemoptysis; No shortness of breath  CARDIOVASCULAR:  No chest pain or palpitations  GASTROINTESTINAL:  No abdominal or epigastric pain. No nausea, vomiting, or hematemesis; No diarrhea or constipation. No melena or hematochezia.  GENITOURINARY:  No dysuria, frequency or hematuria  NEUROLOGICAL:  No numbness or weakness  MSK: no back pain, no joint pain  SKIN:  No itching, rashes    PE:  Vital Signs Last 24 Hrs  T(C): 36.8 (06 Sep 2023 13:51), Max: 36.8 (06 Sep 2023 13:51)  T(F): 98.2 (06 Sep 2023 13:51), Max: 98.2 (06 Sep 2023 13:51)  HR: 63 (06 Sep 2023 13:51) (63 - 88)  BP: 107/68 (06 Sep 2023 13:51) (107/61 - 109/62)  BP(mean): --  RR: 17 (06 Sep 2023 13:51) (17 - 18)  SpO2: 100% (06 Sep 2023 13:51) (97% - 100%)    Parameters below as of 06 Sep 2023 13:51 Patient On (Oxygen Delivery Method): room air    Gen: AOx3, NAD, non-toxic, pleasant  HEAD:  Atraumatic, Normocephalic  EYES: PERRLA, conjunctiva and sclera clear  ENT: Moist mucous membranes  NECK: Supple, No JVD  CV: S1+S2 normal, nontachycardic  Resp: Clear bilat, no resp distress, no crackles/wheezes  Abd: Soft, nontender, +BS  Ext: No LE edema, no cyanosis, pulses +  : No Conley, no dysuria  IV/Skin: No thrombophlebitis  Msk: No low back pain, L hand with healing wound on 3rd digit , ROM preserved L hand  Neuro: AAOx3. No sensory deficits, no motor deficits  Psych: no anxiety, no delusional ideas, no suicidal ideation    LABS:                        13.7   8.44  )-----------( 475      ( 06 Sep 2023 06:18 )             42.1     09-06    137  |  104  |  16  ----------------------------<  96  3.6   |  27  |  0.98    Ca    8.7      06 Sep 2023 06:18      Urinalysis Basic - ( 06 Sep 2023 06:18 )    Color: x / Appearance: x / SG: x / pH: x  Gluc: 96 mg/dL / Ketone: x  / Bili: x / Urobili: x   Blood: x / Protein: x / Nitrite: x   Leuk Esterase: x / RBC: x / WBC x   Sq Epi: x / Non Sq Epi: x / Bacteria: x      MICROBIOLOGY:  v    .Blood Blood  08-31-23   No growth at 5 days  --  --      .Blood Blood-Venous  08-28-23   No growth at 5 days  --  --      .Blood Blood-Peripheral  08-28-23   Growth in anaerobic bottle: Staphylococcus aureus  Direct identification is available within approximately 3-5  hours either by Blood Panel Multiplexed PCR or Direct  MALDI-TOF. Details: [email or web address]  --  Blood Culture PCR  Staphylococcus aureus    HIV-1 RNA Quantitative, Viral Load Log: NOT DET. lg /mL (08-29-23 @ 10:30)    RADIOLOGY:  < from: MR Hand w/wo IV Cont, Left (09.03.23 @ 19:56) >  FINDINGS: There is a wound along the dorsal aspect of the midshaft of the proximal phalanx of the long finger which appears to extend through the extensor mechanism. There is adjacent subcutaneous fat infiltration and enhancement, consistent with cellulitis. There is some high STIR signal throughout the proximal phalanx, suspicious for early osteomyelitis.    There is no fracture. There are no joint effusions or advanced arthritic changes.    IMPRESSION: Wound along the dorsal aspect of the long finger proximal phalanx with adjacent cellulitis. Signal alteration within the long finger proximal phalanx, suspicious for early osteomyelitis.    < from: GAYLE w/Doppler (09.06.23 @ 11:22) >  OBSERVATIONS:  Mitral Valve: Normal mitral valve. Trivial mitral  regurgitation.  Aortic Root: Aortic Root: 2.5 cm.  Ascending Aorta: 2 cm.  Normal aortic root. No atheroma is seen in the descending  aorta and aortic arch.  Aortic Valve: Normal trileaflet aortic valve. No aortic  stenosis. No aortic valve regurgitation seen.  Left Atrium: No left atrium or left atrial appendage  thrombus. Left atrial appendage velocity is normal at 0.73  m/s  Left Ventricle: Normal Left Ventricular Systolic Function,  (EF = 55 to 60%)  Right Heart: No thrombus seen in right atrium. Normal right  ventricular size and function. Normal tricuspid valve.  Trace tricuspid regurgitation. Normal pulmonic valve. No  pulmonic insufficiency is noted.  Pericardium/PleuraNo pericardial effusion.  Hemodynamic: Insufficient tricuspid regurgitation jet to  allow calculation of RVSP.  Atrial septum appears intact on  2D echo and color Doppler.  ------------------------------------------------------------------------  CONCLUSIONS:  1. Normal mitral valve. Trivial mitral regurgitation.  2. Normal trileaflet aortic valve. No aortic stenosis. No  aortic valve regurgitation seen.  3. Normal Left Ventricular Systolic Function,(EF = 55 to  60%)  4. Normal right ventricular size and function.  5. Normal tricuspid valve. Trace tricuspid regurgitation.  6. No pericardial effusion.  7. No obvious evidence of endocarditis    ANTIBIOTICS:  bictegravir 50 mG/emtricitabine 200 mG/tenofovir alafenamide 25 mG (BIKTARVY) 1 daily  ceFAZolin   IVPB 2000 every 8 hours    Procalcitonin, Serum: 0.10 ng/mL (08-27-23 @ 23:10)  Procalcitonin, Serum: 0.09 ng/mL (08-31-23 @ 17:17)  Sedimentation Rate, Erythrocyte: 16 mm/Hr *H* (08-31-23 @ 17:17)  C-Reactive Protein, Serum: 11 mg/L *H* (08-31-23 @ 17:17)  Sedimentation Rate, Erythrocyte: 5 mm/Hr (09-05-23 @ 07:31)  C-Reactive Protein, Serum: <3 mg/L (09-05-23 @ 07:31)    IMPRESSION:  28 yo male with history of PSA w/crystal meth IVDU, Depression with previous suicidal attempt,  HIV + on Biktarvy CD4 367 and VL undetectable, presented to [facility] on 8/28 with complains of L hand swelling, reporting metal ring device encased around his L hand unclear if due to attempt of drug skin popping. At the time he was actively using drugs. Metal device around his hand removed(with difficulty). ID consulted for L hand cellulitis, was started on IV vancomycin on previous admission. Pt d/c on clindamycin for cellulitis, later called back for admission due to bacteremia (MSSA on BC 8/28).    -BSI - MSSA source skin infection(L hand cellulitis +/- IVDU). Blood cultures 8/31 NGTD. TTE with no vegetations. GAYLE with no evidence of vegetations 9/6  -PSA- Opioids and amphetamines   -L hand cellulitis / 3rd digit OM  -HIV infection on Biktarvy, undetectable     PLAN:    Please reach ID with any questions or concerns  Dr. Shell Capone  Available in Teams     Subjective: NAD, afebrile, no N/V or diarrhea, no new symptoms    REVIEW OF SYSTEMS:  CONSTITUTIONAL:  No fevers or chills  EYES/ENT:  No visual changes;  No vertigo or throat pain   NECK:  No pain or stiffness  RESPIRATORY:  No cough, wheezing, hemoptysis; No shortness of breath  CARDIOVASCULAR:  No chest pain or palpitations  GASTROINTESTINAL:  No abdominal or epigastric pain. No nausea, vomiting, or hematemesis; No diarrhea or constipation. No melena or hematochezia.  GENITOURINARY:  No dysuria, frequency or hematuria  NEUROLOGICAL:  No numbness or weakness  MSK: no back pain, no joint pain  SKIN:  No itching, rashes    PE:  Vital Signs Last 24 Hrs  T(C): 36.8 (06 Sep 2023 13:51), Max: 36.8 (06 Sep 2023 13:51)  T(F): 98.2 (06 Sep 2023 13:51), Max: 98.2 (06 Sep 2023 13:51)  HR: 63 (06 Sep 2023 13:51) (63 - 88)  BP: 107/68 (06 Sep 2023 13:51) (107/61 - 109/62)  BP(mean): --  RR: 17 (06 Sep 2023 13:51) (17 - 18)  SpO2: 100% (06 Sep 2023 13:51) (97% - 100%)    Parameters below as of 06 Sep 2023 13:51 Patient On (Oxygen Delivery Method): room air    Gen: AOx3, NAD, non-toxic, pleasant  HEAD:  Atraumatic, Normocephalic  EYES: PERRLA, conjunctiva and sclera clear  ENT: Moist mucous membranes  NECK: Supple, No JVD  CV: S1+S2 normal, nontachycardic  Resp: Clear bilat, no resp distress, no crackles/wheezes  Abd: Soft, nontender, +BS  Ext: No LE edema, no cyanosis, pulses +  : No Conley, no dysuria  IV/Skin: No thrombophlebitis  Msk: No low back pain, L hand with healing wound on 3rd digit , ROM preserved L hand  Neuro: AAOx3. No sensory deficits, no motor deficits  Psych: no anxiety, no delusional ideas, no suicidal ideation    LABS:                        13.7   8.44  )-----------( 475      ( 06 Sep 2023 06:18 )             42.1     09-06    137  |  104  |  16  ----------------------------<  96  3.6   |  27  |  0.98    Ca    8.7      06 Sep 2023 06:18    MICROBIOLOGY:  v    .Blood Blood  08-31-23   No growth at 5 days  --  --      .Blood Blood-Venous  08-28-23   No growth at 5 days  --  --      .Blood Blood-Peripheral  08-28-23   Growth in anaerobic bottle: Staphylococcus aureus  Direct identification is available within approximately 3-5  hours either by Blood Panel Multiplexed PCR or Direct  MALDI-TOF. Details: [email or web address]  --  Blood Culture PCR  Staphylococcus aureus    HIV-1 RNA Quantitative, Viral Load Log: NOT DET. lg /mL (08-29-23 @ 10:30)    RADIOLOGY:  < from: MR Hand w/wo IV Cont, Left (09.03.23 @ 19:56) >  FINDINGS: There is a wound along the dorsal aspect of the midshaft of the proximal phalanx of the long finger which appears to extend through the extensor mechanism. There is adjacent subcutaneous fat infiltration and enhancement, consistent with cellulitis. There is some high STIR signal throughout the proximal phalanx, suspicious for early osteomyelitis.    There is no fracture. There are no joint effusions or advanced arthritic changes.    IMPRESSION: Wound along the dorsal aspect of the long finger proximal phalanx with adjacent cellulitis. Signal alteration within the long finger proximal phalanx, suspicious for early osteomyelitis.    < from: GAYLE w/Doppler (09.06.23 @ 11:22) >  OBSERVATIONS:  Mitral Valve: Normal mitral valve. Trivial mitral  regurgitation.  Aortic Root: Aortic Root: 2.5 cm.  Ascending Aorta: 2 cm.  Normal aortic root. No atheroma is seen in the descending  aorta and aortic arch.  Aortic Valve: Normal trileaflet aortic valve. No aortic  stenosis. No aortic valve regurgitation seen.  Left Atrium: No left atrium or left atrial appendage  thrombus. Left atrial appendage velocity is normal at 0.73  m/s  Left Ventricle: Normal Left Ventricular Systolic Function,  (EF = 55 to 60%)  Right Heart: No thrombus seen in right atrium. Normal right  ventricular size and function. Normal tricuspid valve.  Trace tricuspid regurgitation. Normal pulmonic valve. No  pulmonic insufficiency is noted.  Pericardium/PleuraNo pericardial effusion.  Hemodynamic: Insufficient tricuspid regurgitation jet to  allow calculation of RVSP.  Atrial septum appears intact on  2D echo and color Doppler.  ------------------------------------------------------------------------  CONCLUSIONS:  1. Normal mitral valve. Trivial mitral regurgitation.  2. Normal trileaflet aortic valve. No aortic stenosis. No  aortic valve regurgitation seen.  3. Normal Left Ventricular Systolic Function,(EF = 55 to 60%)  4. Normal right ventricular size and function.  5. Normal tricuspid valve. Trace tricuspid regurgitation.  6. No pericardial effusion.  7. No obvious evidence of endocarditis    ANTIBIOTICS:  bictegravir 50 mG/emtricitabine 200 mG/tenofovir alafenamide 25 mG (BIKTARVY) 1 daily  ceFAZolin   IVPB 2000 every 8 hours    Procalcitonin, Serum: 0.10 ng/mL (08-27-23 @ 23:10)  Procalcitonin, Serum: 0.09 ng/mL (08-31-23 @ 17:17)  Sedimentation Rate, Erythrocyte: 16 mm/Hr *H* (08-31-23 @ 17:17)  C-Reactive Protein, Serum: 11 mg/L *H* (08-31-23 @ 17:17)  Sedimentation Rate, Erythrocyte: 5 mm/Hr (09-05-23 @ 07:31)  C-Reactive Protein, Serum: <3 mg/L (09-05-23 @ 07:31)    IMPRESSION:  26 yo male with history of PSA w/crystal meth IVDU, Depression with previous suicidal attempt,  HIV + on Biktarvy CD4 367 and VL undetectable admitted for MSSA Bacteremia currently doing well, tolerating abx with no issues.    +MSSA on BC 8/28, repeat BS 8/31 NGTD  TTE and GAYLE with no evidence of IE.   L hand 3rd digit where he had traumatic injury and cellulitis on previous admission now with evidence of early OM which is likely the source of the bacteremia.     -BSI - MSSA bacteremia, source skin infection(L hand cellulitis +/- IVDU). Blood cultures 8/31 NGTD. TTE with no vegetations. GAYLE with no evidence of vegetations 9/6  -L hand cellulitis / 3rd digit OM  -HIV infection on Biktarvy, undetectable   -PSA- IV opioids and amphetamines    PLAN:  Continue Cefazolin 2g q8 hrs IV for 4 weeks(for the bacteremia) form negative blood cx (8/31-9/28) for the MSSA bacteremia after which can transition to Minocycline 100 mg q12 hrs PO for 2 more weeks( 6 weeks for the L 3rd digit OM).    Weekly labs CBC w/diff, CMP, ESR and CRP    When transition to minocycline advise pt to take it with meals and to avoid sun exposure    Wound Care.    Drug detox and rehab    Continue Biktarvy 1tab q24 hrs PO, follow up with HIV PMD OP    Discussed with the pt on the bedside    Discussed with Dr. Campoverde    ID will be available PRN    Please reach ID with any questions or concerns  Dr. Shell Capone  Available in Teams

## 2023-09-06 NOTE — PROGRESS NOTE ADULT - SUBJECTIVE AND OBJECTIVE BOX
Patient is a 27y old  Male who presents with a chief complaint of MSSA bacteremia (05 Sep 2023 12:11)      INTERVAL HPI/OVERNIGHT EVENTS: no acute events overnight         REVIEW OF SYSTEMS:  CONSTITUTIONAL: No fever, chills  ENMT:  No difficulty hearing, no change in vision  NECK: No pain or stiffness  RESPIRATORY: No cough, SOB  CARDIOVASCULAR: No chest pain, palpitations  GASTROINTESTINAL: No abdominal pain. No nausea, vomiting, or diarrhea  GENITOURINARY: No dysuria  NEUROLOGICAL: No HA  SKIN: No itching, burning, rashes, or lesions   LYMPH NODES: No enlarged glands  ENDOCRINE: No heat or cold intolerance; No hair loss  MUSCULOSKELETAL: No joint pain or swelling; No muscle, back, or extremity pain  PSYCHIATRIC: No depression, anxiety  HEME/LYMPH: No easy bruising, or bleeding gums    T(C): 36.6 (09-06-23 @ 04:40), Max: 36.7 (09-05-23 @ 14:02)  HR: 75 (09-06-23 @ 04:40) (75 - 89)  BP: 107/61 (09-06-23 @ 04:40) (107/61 - 115/60)  RR: 18 (09-06-23 @ 04:40) (17 - 18)  SpO2: 99% (09-06-23 @ 04:40) (96% - 99%)  Wt(kg): --Vital Signs Last 24 Hrs  T(C): 36.6 (06 Sep 2023 04:40), Max: 36.7 (05 Sep 2023 14:02)  T(F): 97.8 (06 Sep 2023 04:40), Max: 98.1 (05 Sep 2023 14:02)  HR: 75 (06 Sep 2023 04:40) (75 - 89)  BP: 107/61 (06 Sep 2023 04:40) (107/61 - 115/60)  BP(mean): --  RR: 18 (06 Sep 2023 04:40) (17 - 18)  SpO2: 99% (06 Sep 2023 04:40) (96% - 99%)    Parameters below as of 06 Sep 2023 04:40  Patient On (Oxygen Delivery Method): room air    MEDICATIONS  (STANDING):  bacitracin   Ointment 1 Application(s) Topical daily  bictegravir 50 mG/emtricitabine 200 mG/tenofovir alafenamide 25 mG (BIKTARVY) 1 Tablet(s) Oral daily  ceFAZolin   IVPB 2000 milliGRAM(s) IV Intermittent every 8 hours  cholecalciferol 2000 Unit(s) Oral daily  multivitamin 1 Tablet(s) Oral daily    MEDICATIONS  (PRN):  ibuprofen  Tablet. 400 milliGRAM(s) Oral every 8 hours PRN Temp greater or equal to 38C (100.4F), Mild Pain (1 - 3)  ondansetron Injectable 4 milliGRAM(s) IV Push every 8 hours PRN Nausea and/or Vomiting      PHYSICAL EXAM:  GENERAL: NAD  EYES: clear conjunctiva; EOMI  ENMT: Moist mucous membranes  NECK: Supple, No JVD, Normal thyroid  CHEST/LUNG: Clear to auscultation bilaterally; No rales, rhonchi, wheezing, or rubs  HEART: S1, S2, Regular rate and rhythm  ABDOMEN: Soft, Nontender, Nondistended; Bowel sounds present  NEURO: Alert & Oriented X3  EXTREMITIES: No LE edema, no calf tenderness, L hand swelling   LYMPH: No lymphadenopathy noted  SKIN: No rashes or lesions    Consultant(s) Notes Reviewed:  [x ] YES  [ ] NO  Care Discussed with Consultants/Other Providers [ x] YES  [ ] NO    LABS:                        13.7   8.44  )-----------( 475      ( 06 Sep 2023 06:18 )             42.1     09-06    137  |  104  |  16  ----------------------------<  96  3.6   |  27  |  0.98    Ca    8.7      06 Sep 2023 06:18        CAPILLARY BLOOD GLUCOSE    Urinalysis Basic - ( 06 Sep 2023 06:18 )    Color: x / Appearance: x / SG: x / pH: x  Gluc: 96 mg/dL / Ketone: x  / Bili: x / Urobili: x   Blood: x / Protein: x / Nitrite: x   Leuk Esterase: x / RBC: x / WBC x   Sq Epi: x / Non Sq Epi: x / Bacteria: x      RADIOLOGY & ADDITIONAL TESTS:    Imaging Personally Reviewed:  [x ] YES  [ ] NO  < from: MR Johnson w/wo IV Cont, Left (09.03.23 @ 19:56) >    ACC: [id number] EXAM:  MR JOHNSON WAW IC LT   ORDERED BY: CRIS DAVIS     PROCEDURE DATE:  09/03/2023          INTERPRETATION:  EXAMINATION: MRI of the left hand with and without   contrast    CLINICAL INFORMATION: Bacteremia    TECHNIQUE: Multiplanar,multisequential MR imaging was performed. This   includes T1-weighted images after the administration of 7.5 mL of   intravenous Gadavist.    FINDINGS: There is a wound along the dorsal aspect of the midshaft of the   proximal phalanx of the long finger which appears to extend through the   extensor mechanism. There is adjacent subcutaneous fat infiltration and   enhancement, consistent with cellulitis. There is some high STIR signal   throughout the proximal phalanx, suspicious for early osteomyelitis.    There is no fracture. There are no joint effusions or advanced arthritic   changes.    IMPRESSION: Wound along the dorsal aspect of the long finger proximal   phalanx with adjacent cellulitis. Signal alteration within the long   finger proximal phalanx, suspicious for early osteomyelitis.    --- End of Report ---            KELI LEDESMA MD; Attending Radiologist  This document has been electronically signed. Sep  4 2023  4:17PM    < end of copied text >

## 2023-09-06 NOTE — PROGRESS NOTE ADULT - ASSESSMENT
28 yo M w crystal meth IV drug use, (w hx of rhabdo in 2016 from crystal meth use), HIV+ on Biktarvy (viral load undetectable 8/2023), with hx of suicide attempt and unspecified hx of psychosis/hallucinations. Pt was admitted to [facility] on 8/28 and left AMA on 8/29, after intoxicated with meth and placed a metal ring or device on his L middle and L ring fingers 2 days prior to ED presentation. Pt required procedural sedation w ketamine, ativan, and pain relief w morphine to facilitate removal of the device w lilli by ERENDIRA. He was given IV abx and then dc with Clindamycin PO when he left AMA. Blood cultures came back pos for MSSA, so pt was called to come back to ED.   Admitted for MSSA bacteremia, started on Cefazolin, ID Dr. Tavares consulted, repeat cultures negative, TTE no endocarditis. Pending GAYLE 9/6

## 2023-09-06 NOTE — PROGRESS NOTE ADULT - NS ATTEND AMEND GEN_ALL_CORE FT
Patient seen at bedside, states he feels good, ready for GAYLE.     On exam, patient is NAD, the left finger wound is noted to have no bleeding or exudate, clean and dry.   CBC, BMP reviewed, both unchanged and stable.   Repeat BCx 8/31 negative.     Assessment and plan   Patient is a 27 year old male with well controlled HIV infection on antiretroviral therapy and history of IV drug use/polysubstance use who is admitted with MSSA bacteremia. His source was likely his skin/soft tissue infection of the left hand (in addition to obvious concern of IV drug use). MRI of L hand shows signs of possible OM. TTE negative for vegetations, now proceeding to GAYLE.      #MSSA Bacteremia (repeat BCX 8/31 negative at 48 hours)   #Left Hand Cellulitis, rule out septic arthritis    #HIV Infection (Well controlled)    #Polysubstance Use     #History of IV Drug Use    -follow up Infectious Disease recommendations    -continue IV cefazolin   -GAYLE planned 9/6   -patient reportedly has scheduled rehab program starting 9/5   -prn analgesia for left hand   -follow up wound team recommendations re: left hand wound

## 2023-09-07 LAB
ALBUMIN SERPL ELPH-MCNC: 3.2 G/DL — LOW (ref 3.5–5)
ALP SERPL-CCNC: 51 U/L — SIGNIFICANT CHANGE UP (ref 40–120)
ALT FLD-CCNC: 16 U/L DA — SIGNIFICANT CHANGE UP (ref 10–60)
ANION GAP SERPL CALC-SCNC: 5 MMOL/L — SIGNIFICANT CHANGE UP (ref 5–17)
AST SERPL-CCNC: 24 U/L — SIGNIFICANT CHANGE UP (ref 10–40)
BILIRUB SERPL-MCNC: 0.2 MG/DL — SIGNIFICANT CHANGE UP (ref 0.2–1.2)
BUN SERPL-MCNC: 26 MG/DL — HIGH (ref 7–18)
CALCIUM SERPL-MCNC: 9.2 MG/DL — SIGNIFICANT CHANGE UP (ref 8.4–10.5)
CHLORIDE SERPL-SCNC: 106 MMOL/L — SIGNIFICANT CHANGE UP (ref 96–108)
CO2 SERPL-SCNC: 28 MMOL/L — SIGNIFICANT CHANGE UP (ref 22–31)
CREAT SERPL-MCNC: 1.25 MG/DL — SIGNIFICANT CHANGE UP (ref 0.5–1.3)
EGFR: 80 ML/MIN/1.73M2 — SIGNIFICANT CHANGE UP
GLUCOSE SERPL-MCNC: 101 MG/DL — HIGH (ref 70–99)
HCT VFR BLD CALC: 40.9 % — SIGNIFICANT CHANGE UP (ref 39–50)
HGB BLD-MCNC: 13.3 G/DL — SIGNIFICANT CHANGE UP (ref 13–17)
MCHC RBC-ENTMCNC: 30.6 PG — SIGNIFICANT CHANGE UP (ref 27–34)
MCHC RBC-ENTMCNC: 32.5 GM/DL — SIGNIFICANT CHANGE UP (ref 32–36)
MCV RBC AUTO: 94.2 FL — SIGNIFICANT CHANGE UP (ref 80–100)
NRBC # BLD: 0 /100 WBCS — SIGNIFICANT CHANGE UP (ref 0–0)
PLATELET # BLD AUTO: 459 K/UL — HIGH (ref 150–400)
POTASSIUM SERPL-MCNC: 3.6 MMOL/L — SIGNIFICANT CHANGE UP (ref 3.5–5.3)
POTASSIUM SERPL-SCNC: 3.6 MMOL/L — SIGNIFICANT CHANGE UP (ref 3.5–5.3)
PROT SERPL-MCNC: 6.6 G/DL — SIGNIFICANT CHANGE UP (ref 6–8.3)
RBC # BLD: 4.34 M/UL — SIGNIFICANT CHANGE UP (ref 4.2–5.8)
RBC # FLD: 13.1 % — SIGNIFICANT CHANGE UP (ref 10.3–14.5)
SODIUM SERPL-SCNC: 139 MMOL/L — SIGNIFICANT CHANGE UP (ref 135–145)
WBC # BLD: 9.56 K/UL — SIGNIFICANT CHANGE UP (ref 3.8–10.5)
WBC # FLD AUTO: 9.56 K/UL — SIGNIFICANT CHANGE UP (ref 3.8–10.5)

## 2023-09-07 PROCEDURE — 99232 SBSQ HOSP IP/OBS MODERATE 35: CPT

## 2023-09-07 RX ADMIN — Medication 400 MILLIGRAM(S): at 10:20

## 2023-09-07 RX ADMIN — Medication 400 MILLIGRAM(S): at 21:37

## 2023-09-07 RX ADMIN — Medication 100 MILLIGRAM(S): at 21:37

## 2023-09-07 RX ADMIN — Medication 400 MILLIGRAM(S): at 09:33

## 2023-09-07 RX ADMIN — Medication 100 MILLIGRAM(S): at 14:51

## 2023-09-07 RX ADMIN — BICTEGRAVIR SODIUM, EMTRICITABINE, AND TENOFOVIR ALAFENAMIDE FUMARATE 1 TABLET(S): 30; 120; 15 TABLET ORAL at 12:49

## 2023-09-07 RX ADMIN — Medication 400 MILLIGRAM(S): at 22:37

## 2023-09-07 RX ADMIN — Medication 1 APPLICATION(S): at 12:48

## 2023-09-07 RX ADMIN — Medication 2000 UNIT(S): at 12:48

## 2023-09-07 RX ADMIN — Medication 100 MILLIGRAM(S): at 06:01

## 2023-09-07 NOTE — PROGRESS NOTE ADULT - NS ATTEND AMEND GEN_ALL_CORE FT
Patient seen at bedside, states he feels good, no change in the symptoms since yesterday. Explained the estimated abx course noted below.   On exam, patient is NAD, the left finger wound is noted to have no bleeding or exudate, clean and dry.   CBC, BMP reviewed, both unchanged and stable.   Repeat BCx 8/31 negative.     Assessment and plan   Patient is a 27 year old male with well controlled HIV infection on antiretroviral therapy and history of IV drug use/polysubstance use who is admitted with MSSA bacteremia. His source was likely his skin/soft tissue infection of the left hand (in addition to obvious concern of IV drug use). MRI of L hand shows signs of possible OM. TTE and GAYLE negative for vegetations.     #MSSA Bacteremia (repeat BCX 8/31 negative at 48 hours)   #Left Hand Cellulitis, rule out septic arthritis    #HIV Infection (Well controlled)    #Polysubstance Use     #History of IV Drug Use    -ID recs appreciated   -continue IV cefazolin, planning 4 weeks (-9/28) followed by 2-week minocycline for hand OM

## 2023-09-07 NOTE — PROGRESS NOTE ADULT - SUBJECTIVE AND OBJECTIVE BOX
Patient is a 27y old  Male who presents with a chief complaint of MSSA bacteremia (06 Sep 2023 15:32)      INTERVAL HPI/OVERNIGHT EVENTS: no acute events overnight       REVIEW OF SYSTEMS:  CONSTITUTIONAL: No fever, chills  ENMT:  No difficulty hearing, no change in vision  NECK: No pain or stiffness  RESPIRATORY: No cough, SOB  CARDIOVASCULAR: No chest pain, palpitations  GASTROINTESTINAL: No abdominal pain. No nausea, vomiting, or diarrhea  GENITOURINARY: No dysuria  NEUROLOGICAL: No HA  SKIN: No itching, burning, rashes, or lesions   LYMPH NODES: No enlarged glands  ENDOCRINE: No heat or cold intolerance; No hair loss  MUSCULOSKELETAL: No joint pain or swelling; No muscle, back, or extremity pain  PSYCHIATRIC: No depression, anxiety  HEME/LYMPH: No easy bruising, or bleeding gums    T(C): 36.7 (09-07-23 @ 12:19), Max: 36.7 (09-06-23 @ 20:16)  HR: 78 (09-07-23 @ 12:19) (65 - 78)  BP: 117/70 (09-07-23 @ 12:19) (102/64 - 117/70)  RR: 17 (09-07-23 @ 12:19) (16 - 17)  SpO2: 97% (09-07-23 @ 12:19) (97% - 100%)  Wt(kg): --Vital Signs Last 24 Hrs  T(C): 36.7 (07 Sep 2023 12:19), Max: 36.7 (06 Sep 2023 20:16)  T(F): 98.1 (07 Sep 2023 12:19), Max: 98.1 (07 Sep 2023 12:19)  HR: 78 (07 Sep 2023 12:19) (65 - 78)  BP: 117/70 (07 Sep 2023 12:19) (102/64 - 117/70)  BP(mean): --  RR: 17 (07 Sep 2023 12:19) (16 - 17)  SpO2: 97% (07 Sep 2023 12:19) (97% - 100%)    Parameters below as of 07 Sep 2023 12:19  Patient On (Oxygen Delivery Method): room air    MEDICATIONS  (STANDING):  bacitracin   Ointment 1 Application(s) Topical daily  bictegravir 50 mG/emtricitabine 200 mG/tenofovir alafenamide 25 mG (BIKTARVY) 1 Tablet(s) Oral daily  ceFAZolin   IVPB 2000 milliGRAM(s) IV Intermittent every 8 hours  chlorhexidine 2% Cloths 1 Application(s) Topical <User Schedule>  cholecalciferol 2000 Unit(s) Oral daily  multivitamin 1 Tablet(s) Oral daily    MEDICATIONS  (PRN):  ibuprofen  Tablet. 400 milliGRAM(s) Oral every 8 hours PRN Temp greater or equal to 38C (100.4F), Mild Pain (1 - 3)  ondansetron Injectable 4 milliGRAM(s) IV Push every 8 hours PRN Nausea and/or Vomiting      PHYSICAL EXAM:  GENERAL: NAD  EYES: clear conjunctiva; EOMI  ENMT: Moist mucous membranes  NECK: Supple, No JVD, Normal thyroid  CHEST/LUNG: Clear to auscultation bilaterally; No rales, rhonchi, wheezing, or rubs  HEART: S1, S2, Regular rate and rhythm  ABDOMEN: Soft, Nontender, Nondistended; Bowel sounds present  NEURO: Alert & Oriented X3  EXTREMITIES: No LE edema, no calf tenderness, L hand swelling   LYMPH: No lymphadenopathy noted  SKIN: No rashes or lesions    Consultant(s) Notes Reviewed:  [x ] YES  [ ] NO  Care Discussed with Consultants/Other Providers [ x] YES  [ ] NO    LABS:                        13.3   9.56  )-----------( 459      ( 07 Sep 2023 06:31 )             40.9     09-07    139  |  106  |  26<H>  ----------------------------<  101<H>  3.6   |  28  |  1.25    Ca    9.2      07 Sep 2023 06:31    TPro  6.6  /  Alb  3.2<L>  /  TBili  0.2  /  DBili  x   /  AST  24  /  ALT  16  /  AlkPhos  51  09-07      CAPILLARY BLOOD GLUCOSE    Urinalysis Basic - ( 07 Sep 2023 06:31 )    Color: x / Appearance: x / SG: x / pH: x  Gluc: 101 mg/dL / Ketone: x  / Bili: x / Urobili: x   Blood: x / Protein: x / Nitrite: x   Leuk Esterase: x / RBC: x / WBC x   Sq Epi: x / Non Sq Epi: x / Bacteria: x        RADIOLOGY & ADDITIONAL TESTS:    Imaging Personally Reviewed:  [x ] YES  [ ] NO  < from: MR Hand w/wo IV Cont, Left (09.03.23 @ 19:56) >    ACC: [id number] EXAM:  MR HAND WAW IC LT   ORDERED BY: CRIS DAVIS     PROCEDURE DATE:  09/03/2023          INTERPRETATION:  EXAMINATION: MRI of the left hand with and without   contrast    CLINICAL INFORMATION: Bacteremia    TECHNIQUE: Multiplanar,multisequential MR imaging was performed. This   includes T1-weighted images after the administration of 7.5 mL of   intravenous Gadavist.    FINDINGS: There is a wound along the dorsal aspect of the midshaft of the   proximal phalanx of the long finger which appears to extend through the   extensor mechanism. There is adjacent subcutaneous fat infiltration and   enhancement, consistent with cellulitis. There is some high STIR signal   throughout the proximal phalanx, suspicious for early osteomyelitis.    There is no fracture. There are no joint effusions or advanced arthritic   changes.    IMPRESSION: Wound along the dorsal aspect of the long finger proximal   phalanx with adjacent cellulitis. Signal alteration within the long   finger proximal phalanx, suspicious for early osteomyelitis.    --- End of Report ---            KELI LEDESMA MD; Attending Radiologist  This document has been electronically signed. Sep  4 2023  4:17PM    < end of copied text >

## 2023-09-07 NOTE — PROGRESS NOTE ADULT - ASSESSMENT
26 yo M w crystal meth IV drug use, (w hx of rhabdo in 2016 from crystal meth use), HIV+ on Biktarvy (viral load undetectable 8/2023), with hx of suicide attempt and unspecified hx of psychosis/hallucinations. Pt was admitted to [facility] on 8/28 and left AMA on 8/29, after intoxicated with meth and placed a metal ring or device on his L middle and L ring fingers 2 days prior to ED presentation. Pt required procedural sedation w ketamine, ativan, and pain relief w morphine to facilitate removal of the device w lilli by ERENDIRA. He was given IV abx and then dc with Clindamycin PO when he left AMA. Blood cultures came back pos for MSSA, so pt was called to come back to ED.   Admitted for MSSA bacteremia, started on Cefazolin, ID Dr. Tavares consulted, repeat cultures negative, TTE no endocarditis. GAYLE 9/6, no endocarditis.

## 2023-09-08 PROCEDURE — 99232 SBSQ HOSP IP/OBS MODERATE 35: CPT

## 2023-09-08 RX ADMIN — CHLORHEXIDINE GLUCONATE 1 APPLICATION(S): 213 SOLUTION TOPICAL at 05:06

## 2023-09-08 RX ADMIN — Medication 400 MILLIGRAM(S): at 23:39

## 2023-09-08 RX ADMIN — Medication 100 MILLIGRAM(S): at 13:22

## 2023-09-08 RX ADMIN — Medication 1 TABLET(S): at 13:09

## 2023-09-08 RX ADMIN — Medication 400 MILLIGRAM(S): at 23:09

## 2023-09-08 RX ADMIN — Medication 2000 UNIT(S): at 13:22

## 2023-09-08 RX ADMIN — Medication 100 MILLIGRAM(S): at 05:06

## 2023-09-08 RX ADMIN — Medication 1 APPLICATION(S): at 13:09

## 2023-09-08 RX ADMIN — BICTEGRAVIR SODIUM, EMTRICITABINE, AND TENOFOVIR ALAFENAMIDE FUMARATE 1 TABLET(S): 30; 120; 15 TABLET ORAL at 13:09

## 2023-09-08 RX ADMIN — Medication 100 MILLIGRAM(S): at 21:26

## 2023-09-08 NOTE — PROGRESS NOTE ADULT - PROBLEM SELECTOR PLAN 4
-uses IV crystal methamphetamine, has been arrested and is pending court mandated rehab in 1 week  -ALEYDA consult

## 2023-09-08 NOTE — PROGRESS NOTE ADULT - PROBLEM SELECTOR PLAN 8
-discharge disposition pending bacteremia work up, if long term IV abxs are requires, home IV infusion is not an option given pt is currently use IV drugs  - f/u GAYLE today
-discharge disposition- requiring long term IV abxs.  Home IV infusion is not an option given pt urrently uses IV drugs  - Pt will likely need to stay in hospital to complete course of abx
-discharge disposition pending bacteremia work up, if long term IV abxs are requires, home IV infusion is not an option given pt is currently use IV drugs  - Pt will likely need to stay in hospital to complete course of abx
-discharge disposition pending bacteremia work up, if long term IV abxs are requires, home IV infusion is not an option given pt is currently use IV drugs  - f/u GAYLE in am
-discharge disposition pending bacteremia work up, if long term IV abxs are requires, home IV infusion is not an option given pt is currently use IV drugs

## 2023-09-08 NOTE — PROGRESS NOTE ADULT - PROBLEM SELECTOR PLAN 7
-dvt ppx: low risk, ambulatory

## 2023-09-08 NOTE — PROGRESS NOTE ADULT - PROBLEM SELECTOR PROBLEM 6
Poor social situation

## 2023-09-08 NOTE — PROGRESS NOTE ADULT - NS ATTEND AMEND GEN_ALL_CORE FT
Patient seen at bedside, denying symptoms. Seen walking in the hallway.          On exam, pt is NAD, no redness or erythema around the PIV insertion site.          No labs available to review today.          Assessment and plan          Patient is a 47 yo Male undocumented and uninsured with a h/o Alcohol abuse, pancytopenia, cirrhosis, multiple admits for alcohol withdrawal admitted to ICU on 8/13 for EtOH intoxication/withdrawal with encephalopathy. Course complicated by sepsis and found to be Influenza positive and GBS bacteremia (presumed source of cellulitis on legs vs respiratory).  Seen initially by Dr. Tavares and Dr. Boyd. Needs IV ceftriaxone through 9/12. Has finished Ativan taper and has been calm and cooperative.  Per  team, patient does not have insurance and hence he needs to complete the entire abx course inpatient. Otherwise patient is functional, would check labs 1-2 times a day.         #GBS Bacteremia    #ETOH Use Disorder   #ETOH Withdrawal, resolved   #Hypomagnesemia      -current plan is to keep him until 9/12 when ceftriaxone course is done    -disposition planning occurring, brother has stated patient cannot return to live with him, may need to explore the shelter option Patient seen at bedside, states he feels good, states that he can stay until Monday for IV abx treatment but might need to leave after that.     On exam, patient is NAD, Heart sounds without murmur. The left finger wound is noted to have no bleeding or exudate, clean and dry.     No labs available to review.     Assessment and plan     Patient is a 27 year old male with well controlled HIV infection on antiretroviral therapy and history of IV drug use/polysubstance use who is admitted with MSSA bacteremia. His source was likely his skin/soft tissue infection of the left hand (in addition to obvious concern of IV drug use). MRI of L hand shows signs of possible OM. TTE and GAYLE negative for vegetations.        #MSSA Bacteremia (repeat BCX 8/31 negative)   #Left Hand Cellulitis, rule out septic arthritis    #HIV Infection (Well controlled)    #Polysubstance Use     #History of IV Drug Use      -ID recs appreciated   -continue IV cefazolin, planning 4 weeks (-9/28) followed by 2-week minocycline for hand OM

## 2023-09-08 NOTE — PROGRESS NOTE ADULT - PROBLEM SELECTOR PLAN 3
-on Biktarvy, diagnosed with HIV in 2015 and had been getting HIV care/medications since 2017 from [facility] ([place] location) with NP Jonel Hewitt  -viral load 8/29/2023 undetectable  -CD4 counts 367 on 8/29/2023  -cont Biktarvy  -ID Dr. Tavares
-on Biktarvy, diagnosed with HIV in 2015 and had been getting HIV care/medications since 2017 from [facility] ([place] location) with NP Jonel Hewitt  -viral load 8/29/2023 undetectable  -CD4 counts 367 on 8/29/2023  -cont Biktarvy  -ID Dr. Tavares
-on Biktarvy, diagnosed with HIV in 2015 and had been getting HIV care/medications since 2017 from [facility] ([place] location) with NP Jonel Hewitt  -viral load 8/29/2023 undetectable  -CD4 counts 367 on 8/29/2023  -cont Biktarvy  -ID Dr. Tavares
-on Biktarvy, diagnosed with HIV in 2015 and had been getting HIV care/medications since 2017 from [facility] ([place] location) with NP Jonel Hewitt  -viral load 8/29/2023 undetectable  -CD4 counts 367 on 8/29/2023  -cont Biktarvy  -ID Dr. Tavares
-on Biktarvy, diagnosed with HIV in 2015 and had been getting HIV care/medications since 2017 from [facility] ([place] location) with NP Jonel Hewitt  -viral load 8/29/2023 undetectable  -CD4 counts 367 on 8/29/2023  -cont Biktarvy  -ID Dr. Tavares

## 2023-09-08 NOTE — PROGRESS NOTE ADULT - PROBLEM SELECTOR PLAN 5
-hx of depression and suicide attempt in 2016, denies any current suicidal and homicidal ideation

## 2023-09-08 NOTE — PROGRESS NOTE ADULT - PROBLEM SELECTOR PLAN 2
- L. Hand 3rd Finger wound   - MRI suspicious for osteo  - cont plan as above
- L. Hand 3rd Finger wound   - MRI suspicious for osteo  - cont plan as above
-wound care recs as per previous admission: -Clean the L. Hand 3rd Finger wound with normal saline and pat dry. Apply Bacitracin ointment to the wound bed and cover with a non-adherent dry dressing Daily PRN  -supportive measures   -rest plan as above
- L. Hand 3rd Finger wound   - MRI suspicious for osteo  - cont plan as above
- L. Hand 3rd Finger wound   - MRI suspicious for osteo  - cont plan as above

## 2023-09-08 NOTE — PROGRESS NOTE ADULT - PROBLEM SELECTOR PLAN 6
-lost his job after being arrested for drug use. Court mandated rehab in 1 week has court   -ALEYDA la

## 2023-09-08 NOTE — PROGRESS NOTE ADULT - PROBLEM SELECTOR PLAN 1
-pt was intoxicated and had caused prolonged mechanical injury to hand from placing ringlike device for 2-3 days on hand, was removed in the ED on 8/28. Pt left AMA on 8/29 and was discharged on Clindamycin   -MSSA bacteremia   -cont Cefazolin   -TTE no endocarditis  -f/u repeat cultures negative   - f/u GAYLE in am, npo after MD- Card Dr Michelle   -ID Dr. Tavares
-pt was intoxicated and had caused prolonged mechanical injury to hand from placing ringlike device for 2-3 days on hand, was removed in the ED on 8/28. Pt left AMA on 8/29 and was discharged on Clindamycin   -MSSA bacteremia   -cont Cefazolin   -TTE no endocarditis  -Repeat cultures negative   - f/u GAYLE 9/6, - Card Dr Michelle   -ID Dr. Tavares
-pt was intoxicated and had caused prolonged mechanical injury to hand from placing ringlike device for 2-3 days on hand, was removed in the ED on 8/28. Pt left AMA on 8/29 and was discharged on Clindamycin   -MSSA bacteremia   -cont Cefazolin   -TTE no endocarditis  -Repeat cultures negative   -GAYLE 9/6 no endocarditis  -ID Dr. Tavares
-pt was intoxicated and had caused prolonged mechanical injury to hand from placing ringlike device for 2-3 days on hand, was removed in the ED on 8/28. Pt left AMA on 8/29 and was discharged on Clindamycin   -MSSA bacteremia   -cont Cefazolin   -TTE no endocarditis  -Repeat cultures negative   -GAYLE 9/6 no endocarditis  -ID Dr. Tavares
-pt was intoxicated and had caused prolonged mechanical injury to hand from placing ringlike device for 2-3 days on hand, was removed in the ED on 8/28. Pt left AMA on 8/29 and was discharged on Clindamycin   -MSSA bacteremia   -cont Cefazolin   -f/u Echo given IV drug use  -f/u repeat cultures   -ID Dr. Tavares

## 2023-09-08 NOTE — PROGRESS NOTE ADULT - SUBJECTIVE AND OBJECTIVE BOX
NP Note discussed with  Primary Attending    Patient is a 27y old  Male who presents with a chief complaint of MSSA bacteremia (07 Sep 2023 13:54)      INTERVAL HPI/OVERNIGHT EVENTS: no new complaints    MEDICATIONS  (STANDING):  bacitracin   Ointment 1 Application(s) Topical daily  bictegravir 50 mG/emtricitabine 200 mG/tenofovir alafenamide 25 mG (BIKTARVY) 1 Tablet(s) Oral daily  ceFAZolin   IVPB 2000 milliGRAM(s) IV Intermittent every 8 hours  chlorhexidine 2% Cloths 1 Application(s) Topical <User Schedule>  cholecalciferol 2000 Unit(s) Oral daily  multivitamin 1 Tablet(s) Oral daily    MEDICATIONS  (PRN):  ibuprofen  Tablet. 400 milliGRAM(s) Oral every 8 hours PRN Temp greater or equal to 38C (100.4F), Mild Pain (1 - 3)  ondansetron Injectable 4 milliGRAM(s) IV Push every 8 hours PRN Nausea and/or Vomiting      __________________________________________________  REVIEW OF SYSTEMS:    CONSTITUTIONAL: No fever,   EYES: no acute visual disturbances  NECK: No pain or stiffness  RESPIRATORY: No cough; No shortness of breath  CARDIOVASCULAR: No chest pain, no palpitations  GASTROINTESTINAL: No pain. No nausea or vomiting; No diarrhea   NEUROLOGICAL: No headache or numbness, no tremors  MUSCULOSKELETAL: No joint pain, no muscle pain  GENITOURINARY: no dysuria, no frequency, no hesitancy  PSYCHIATRY: no depression , no anxiety  ALL OTHER  ROS negative        Vital Signs Last 24 Hrs  T(C): 36.4 (08 Sep 2023 13:18), Max: 36.7 (07 Sep 2023 20:34)  T(F): 97.6 (08 Sep 2023 13:18), Max: 98 (07 Sep 2023 20:34)  HR: 75 (08 Sep 2023 13:18) (64 - 83)  BP: 103/62 (08 Sep 2023 13:18) (97/62 - 111/69)  BP(mean): --  RR: 17 (08 Sep 2023 13:18) (17 - 18)  SpO2: 100% (08 Sep 2023 13:18) (96% - 100%)    Parameters below as of 08 Sep 2023 13:18  Patient On (Oxygen Delivery Method): room air        ________________________________________________  PHYSICAL EXAM:  GENERAL: NAD  HEENT: Normocephalic;  conjunctivae and sclerae clear; moist mucous membranes;   NECK : supple  CHEST/LUNG: Clear to auscultation bilaterally with good air entry   HEART: S1 S2  regular; no murmurs, gallops or rubs  ABDOMEN: Soft, Nontender, Nondistended; Bowel sounds present  EXTREMITIES: no cyanosis; no edema; no calf tenderness  Hand- Left hand 3rd digit with FROM, scab formation over wound area, NT, no erythematous   SKIN: warm and dry; no rash  NERVOUS SYSTEM:  Awake and alert; Oriented  to place, person and time ; no new deficits    _________________________________________________  LABS:                        13.3   9.56  )-----------( 459      ( 07 Sep 2023 06:31 )             40.9     09-07    139  |  106  |  26<H>  ----------------------------<  101<H>  3.6   |  28  |  1.25    Ca    9.2      07 Sep 2023 06:31    TPro  6.6  /  Alb  3.2<L>  /  TBili  0.2  /  DBili  x   /  AST  24  /  ALT  16  /  AlkPhos  51  09-07      Urinalysis Basic - ( 07 Sep 2023 06:31 )    Color: x / Appearance: x / SG: x / pH: x  Gluc: 101 mg/dL / Ketone: x  / Bili: x / Urobili: x   Blood: x / Protein: x / Nitrite: x   Leuk Esterase: x / RBC: x / WBC x   Sq Epi: x / Non Sq Epi: x / Bacteria: x      CAPILLARY BLOOD GLUCOSE            RADIOLOGY & ADDITIONAL TESTS:    Imaging  Reviewed:  YES/NO    Consultant(s) Notes Reviewed:   YES/ No      Plan of care was discussed with patient and /or primary care giver; all questions and concerns were addressed

## 2023-09-08 NOTE — PROGRESS NOTE ADULT - PROBLEM SELECTOR PROBLEM 2
OM (osteomyelitis)
OM (osteomyelitis)
Superficial wound of body region
OM (osteomyelitis)
OM (osteomyelitis)

## 2023-09-08 NOTE — PROGRESS NOTE ADULT - PROBLEM SELECTOR PROBLEM 5
Depression, unspecified

## 2023-09-08 NOTE — PROGRESS NOTE ADULT - PROBLEM SELECTOR PROBLEM 4
Polysubstance use disorder

## 2023-09-09 PROCEDURE — 99232 SBSQ HOSP IP/OBS MODERATE 35: CPT

## 2023-09-09 RX ADMIN — CHLORHEXIDINE GLUCONATE 1 APPLICATION(S): 213 SOLUTION TOPICAL at 05:16

## 2023-09-09 RX ADMIN — Medication 1 APPLICATION(S): at 11:54

## 2023-09-09 RX ADMIN — BICTEGRAVIR SODIUM, EMTRICITABINE, AND TENOFOVIR ALAFENAMIDE FUMARATE 1 TABLET(S): 30; 120; 15 TABLET ORAL at 11:53

## 2023-09-09 RX ADMIN — Medication 400 MILLIGRAM(S): at 10:16

## 2023-09-09 RX ADMIN — Medication 1 TABLET(S): at 11:53

## 2023-09-09 RX ADMIN — Medication 100 MILLIGRAM(S): at 14:54

## 2023-09-09 RX ADMIN — Medication 100 MILLIGRAM(S): at 21:59

## 2023-09-09 RX ADMIN — Medication 400 MILLIGRAM(S): at 11:51

## 2023-09-09 RX ADMIN — Medication 2000 UNIT(S): at 11:55

## 2023-09-09 RX ADMIN — Medication 100 MILLIGRAM(S): at 05:13

## 2023-09-09 NOTE — PROGRESS NOTE ADULT - ASSESSMENT
Patient is a 27 year old male with well controlled HIV infection on antiretroviral therapy and history of IV drug use/polysubstance use who is admitted with MSSA bacteremia. His source was likely his skin/soft tissue infection of the left hand (in addition to obvious concern of IV drug use). MRI of L hand shows signs of possible OM. TTE and GAYLE negative for vegetations.     #MSSA Bacteremia (repeat BCX 8/31 negative at 48 hours)   #Left Hand Cellulitis, rule out septic arthritis    #HIV Infection (Well controlled)    #Polysubstance Use     #History of IV Drug Use    -ID recs appreciated   -continue IV cefazolin, planning 4 weeks (-9/28) followed by 2-week minocycline for hand OM.

## 2023-09-09 NOTE — PROGRESS NOTE ADULT - PROVIDER SPECIALTY LIST ADULT
Hospitalist
Infectious Disease
Internal Medicine
Infectious Disease
Internal Medicine

## 2023-09-09 NOTE — PROGRESS NOTE ADULT - SUBJECTIVE AND OBJECTIVE BOX
[facility] Medicine  Patient is a 27y old  Male who presents with a chief complaint of MSSA bacteremia (08 Sep 2023 15:32)      SUBJECTIVE / OVERNIGHT EVENTS:  No acute events over night. Denies any fevers/chills, headache, CP, SOB, abd pain, N/V/D, constipation, or leg swelling.       MEDICATIONS  (STANDING):  bacitracin   Ointment 1 Application(s) Topical daily  bictegravir 50 mG/emtricitabine 200 mG/tenofovir alafenamide 25 mG (BIKTARVY) 1 Tablet(s) Oral daily  ceFAZolin   IVPB 2000 milliGRAM(s) IV Intermittent every 8 hours  chlorhexidine 2% Cloths 1 Application(s) Topical <User Schedule>  cholecalciferol 2000 Unit(s) Oral daily  multivitamin 1 Tablet(s) Oral daily    MEDICATIONS  (PRN):  ibuprofen  Tablet. 400 milliGRAM(s) Oral every 8 hours PRN Temp greater or equal to 38C (100.4F), Mild Pain (1 - 3)  ondansetron Injectable 4 milliGRAM(s) IV Push every 8 hours PRN Nausea and/or Vomiting          OBJECTIVE:  Vital Signs Last 24 Hrs  T(C): 36.6 (09 Sep 2023 04:57), Max: 36.6 (08 Sep 2023 20:29)  T(F): 97.8 (09 Sep 2023 04:57), Max: 97.9 (08 Sep 2023 20:29)  HR: 73 (09 Sep 2023 04:57) (70 - 75)  BP: 100/66 (09 Sep 2023 04:57) (100/66 - 124/74)  BP(mean): --  RR: 16 (09 Sep 2023 04:57) (16 - 18)  SpO2: 97% (09 Sep 2023 04:57) (97% - 100%)    Parameters below as of 09 Sep 2023 04:57  Patient On (Oxygen Delivery Method): room air        PHYSICAL EXAM:  GENERAL: NAD, well-developed  HEAD:  Atraumatic, Normocephalic  EYES: conjunctiva and sclera clear  NECK: Supple, No JVD  CHEST/LUNG: Clear to auscultation bilaterally; No wheeze  HEART: Regular rate and rhythm; No murmurs, rubs, or gallops  ABDOMEN: Soft, Nontender, Nondistended; Bowel sounds present  EXTREMITIES:  No clubbing, cyanosis, or edema. The left finger wound is noted to have no bleeding or exudate, clean and dry.     PSYCH: AAOx3  NEUROLOGY: non-focal  SKIN: No rashes or lesions      CAPILLARY BLOOD GLUCOSE        I&O's Summary            LABS:                        RADIOLOGY & ADDITIONAL TESTS:

## 2023-09-09 NOTE — PROGRESS NOTE ADULT - REASON FOR ADMISSION
MSSA bacteremia

## 2023-09-10 VITALS
HEART RATE: 95 BPM | DIASTOLIC BLOOD PRESSURE: 60 MMHG | RESPIRATION RATE: 18 BRPM | TEMPERATURE: 98 F | SYSTOLIC BLOOD PRESSURE: 121 MMHG | OXYGEN SATURATION: 98 %

## 2023-09-10 PROCEDURE — 99239 HOSP IP/OBS DSCHRG MGMT >30: CPT

## 2023-09-10 RX ORDER — MINOCYCLINE HYDROCHLORIDE 45 MG/1
1 TABLET, EXTENDED RELEASE ORAL
Qty: 66 | Refills: 0
Start: 2023-09-10 | End: 2023-10-12

## 2023-09-10 RX ORDER — BACITRACIN ZINC 500 UNIT/G
1 OINTMENT IN PACKET (EA) TOPICAL
Qty: 0 | Refills: 0 | DISCHARGE
Start: 2023-09-10

## 2023-09-10 RX ORDER — IBUPROFEN 200 MG
1 TABLET ORAL
Qty: 0 | Refills: 0 | DISCHARGE
Start: 2023-09-10

## 2023-09-10 RX ORDER — BACITRACIN ZINC 500 UNIT/G
1 OINTMENT IN PACKET (EA) TOPICAL
Qty: 1 | Refills: 0
Start: 2023-09-10 | End: 2023-10-09

## 2023-09-10 RX ADMIN — Medication 400 MILLIGRAM(S): at 10:37

## 2023-09-10 RX ADMIN — Medication 400 MILLIGRAM(S): at 11:00

## 2023-09-10 RX ADMIN — Medication 2000 UNIT(S): at 13:28

## 2023-09-10 RX ADMIN — Medication 400 MILLIGRAM(S): at 00:50

## 2023-09-10 RX ADMIN — CHLORHEXIDINE GLUCONATE 1 APPLICATION(S): 213 SOLUTION TOPICAL at 05:51

## 2023-09-10 RX ADMIN — Medication 1 TABLET(S): at 13:26

## 2023-09-10 RX ADMIN — Medication 400 MILLIGRAM(S): at 00:20

## 2023-09-10 RX ADMIN — Medication 100 MILLIGRAM(S): at 13:31

## 2023-09-10 RX ADMIN — Medication 1 APPLICATION(S): at 13:26

## 2023-09-10 RX ADMIN — Medication 100 MILLIGRAM(S): at 05:48

## 2023-09-10 RX ADMIN — BICTEGRAVIR SODIUM, EMTRICITABINE, AND TENOFOVIR ALAFENAMIDE FUMARATE 1 TABLET(S): 30; 120; 15 TABLET ORAL at 13:25

## 2023-09-10 NOTE — DISCHARGE NOTE NURSING/CASE MANAGEMENT/SOCIAL WORK - NSDCPEFALRISK_GEN_ALL_CORE
For information on Fall & Injury Prevention, visit: [email or web address] OR  [email or web address] OR  https://www.cdc.gov/steadi/patient.html

## 2023-09-10 NOTE — DISCHARGE NOTE NURSING/CASE MANAGEMENT/SOCIAL WORK - PATIENT PORTAL LINK FT
You can access the FollowMyHealth Patient Portal offered by [facility] by registering at the following website: [email or web address]. By joining [employer]’s FollowMyHealth portal, you will also be able to view your health information using other applications (apps) compatible with our system.

## 2023-09-10 NOTE — DISCHARGE NOTE NURSING/CASE MANAGEMENT/SOCIAL WORK - NSDCVIVACCINE_GEN_ALL_CORE_FT
Tdap; 27-Aug-2023 23:21; Day Barnhart (BARBY); [employer]; [id number] (Exp. Date: 23-Aug-2025); IntraMuscular; Dorsogluteal Left.; 0.5 milliLiter(s); VIS (VIS Published: 09-May-2013, VIS Presented: 27-Aug-2023);

## 2023-10-10 NOTE — PATIENT PROFILE ADULT - HAVE YOU BEEN EATING POORLY BECAUSE OF A DECREASED APPETITE?
No (0) H Plasty Text: Given the location of the defect, shape of the defect and the proximity to free margins a H-plasty was deemed most appropriate for repair.  Using a sterile surgical marker, the appropriate advancement arms of the H-plasty were drawn incorporating the defect and placing the expected incisions within the relaxed skin tension lines where possible. The area thus outlined was incised deep to adipose tissue with a #15 scalpel blade. The skin margins were undermined to an appropriate distance in all directions utilizing iris scissors.  The opposing advancement arms were then advanced into place in opposite direction and anchored with interrupted buried subcutaneous sutures.

## 2024-01-23 RX ORDER — CHOLECALCIFEROL (VITAMIN D3) 125 MCG
1 CAPSULE ORAL
Refills: 0 | DISCHARGE

## 2024-01-23 RX ORDER — BICTEGRAVIR SODIUM, EMTRICITABINE, AND TENOFOVIR ALAFENAMIDE FUMARATE 30; 120; 15 MG/1; MG/1; MG/1
1 TABLET ORAL
Refills: 0 | DISCHARGE

## 2024-01-23 RX ORDER — CHOLECALCIFEROL (VITAMIN D3) 125 MCG
0 CAPSULE ORAL
Refills: 0 | DISCHARGE

## 2024-06-29 ENCOUNTER — EMERGENCY (EMERGENCY)
Facility: HOSPITAL | Age: 29
LOS: 1 days | Discharge: LEFT BEFORE DISCHARGE | End: 2024-06-29
Payer: MEDICAID

## 2024-06-29 VITALS
WEIGHT: 132.06 LBS | HEART RATE: 80 BPM | DIASTOLIC BLOOD PRESSURE: 73 MMHG | SYSTOLIC BLOOD PRESSURE: 111 MMHG | OXYGEN SATURATION: 98 % | RESPIRATION RATE: 17 BRPM | TEMPERATURE: 98 F

## 2024-06-29 PROCEDURE — L9991: CPT

## 2024-06-30 PROBLEM — T14.91XA SUICIDE ATTEMPT, INITIAL ENCOUNTER: Chronic | Status: ACTIVE | Noted: 2023-08-31

## 2024-06-30 PROBLEM — F32.9 MAJOR DEPRESSIVE DISORDER, SINGLE EPISODE, UNSPECIFIED: Chronic | Status: ACTIVE | Noted: 2023-08-31

## 2024-06-30 PROBLEM — F19.90 OTHER PSYCHOACTIVE SUBSTANCE USE, UNSPECIFIED, UNCOMPLICATED: Chronic | Status: ACTIVE | Noted: 2023-08-28

## 2024-06-30 PROBLEM — B20 HUMAN IMMUNODEFICIENCY VIRUS [HIV] DISEASE: Chronic | Status: ACTIVE | Noted: 2023-08-28

## 2024-10-15 NOTE — PATIENT PROFILE ADULT - NSPROPTRIGHTBILLOFRIGHTS_GEN_A_NUR
----- Message from Eulogio Vargas MD sent at 10/13/2024  8:37 PM CDT -----  Please notify patient that all results showed persistently abnormal thyroid tests. Will need to see endocrine. Please print referral as per patient insurance and  referral. We will see patient at their next appointment.  
LM to call office back   
Patient was informed of results. Patient expressed understanding. No further questions at this time.referral given to patients mother.  
patient

## 2025-02-26 NOTE — SBIRT NOTE ADULT - NSSBIRTDRGBLOUTFLBACK_GEN_A_CORE
Informed pt that forms are done. The pt knows to go to   to  the forms.     Sending a copy to abstracting.    Yes

## 2025-07-12 NOTE — ED PROVIDER NOTE - NS_BEDUNITTYPES_ED_ALL_ED
You can access the FollowMyHealth Patient Portal offered by [facility] by registering at the following website: [email or web address]. By joining [employer]’s FollowMyHealth portal, you will also be able to view your health information using other applications (apps) compatible with our system. MEDICINE